# Patient Record
Sex: MALE | Race: WHITE | Employment: OTHER | URBAN - METROPOLITAN AREA
[De-identification: names, ages, dates, MRNs, and addresses within clinical notes are randomized per-mention and may not be internally consistent; named-entity substitution may affect disease eponyms.]

---

## 2020-07-16 ENCOUNTER — HOSPITAL ENCOUNTER (INPATIENT)
Age: 35
LOS: 5 days | Discharge: HOME | DRG: 753 | End: 2020-07-22
Attending: EMERGENCY MEDICINE | Admitting: PSYCHIATRY & NEUROLOGY
Payer: COMMERCIAL

## 2020-07-16 LAB
A/G RATIO: 2.5 (ref 1.1–2.2)
ACETAMINOPHEN LEVEL: <5 UG/ML (ref 10–30)
ALBUMIN SERPL-MCNC: 4.9 G/DL (ref 3.4–5)
ALP BLD-CCNC: 51 U/L (ref 40–129)
ALT SERPL-CCNC: 18 U/L (ref 10–40)
ANION GAP SERPL CALCULATED.3IONS-SCNC: 12 MMOL/L (ref 3–16)
AST SERPL-CCNC: 20 U/L (ref 15–37)
BASOPHILS ABSOLUTE: 0 K/UL (ref 0–0.2)
BASOPHILS RELATIVE PERCENT: 0.3 %
BILIRUB SERPL-MCNC: 1.3 MG/DL (ref 0–1)
BUN BLDV-MCNC: 11 MG/DL (ref 7–20)
CALCIUM SERPL-MCNC: 9.4 MG/DL (ref 8.3–10.6)
CHLORIDE BLD-SCNC: 101 MMOL/L (ref 99–110)
CO2: 26 MMOL/L (ref 21–32)
CREAT SERPL-MCNC: 1 MG/DL (ref 0.9–1.3)
EOSINOPHILS ABSOLUTE: 0 K/UL (ref 0–0.6)
EOSINOPHILS RELATIVE PERCENT: 0.5 %
ETHANOL: NORMAL MG/DL (ref 0–0.08)
GFR AFRICAN AMERICAN: >60
GFR NON-AFRICAN AMERICAN: >60
GLOBULIN: 2 G/DL
GLUCOSE BLD-MCNC: 111 MG/DL (ref 70–99)
HCT VFR BLD CALC: 46.8 % (ref 40.5–52.5)
HEMOGLOBIN: 16.2 G/DL (ref 13.5–17.5)
LYMPHOCYTES ABSOLUTE: 1.5 K/UL (ref 1–5.1)
LYMPHOCYTES RELATIVE PERCENT: 16.1 %
MCH RBC QN AUTO: 29.2 PG (ref 26–34)
MCHC RBC AUTO-ENTMCNC: 34.6 G/DL (ref 31–36)
MCV RBC AUTO: 84.2 FL (ref 80–100)
MONOCYTES ABSOLUTE: 0.7 K/UL (ref 0–1.3)
MONOCYTES RELATIVE PERCENT: 7.2 %
NEUTROPHILS ABSOLUTE: 6.9 K/UL (ref 1.7–7.7)
NEUTROPHILS RELATIVE PERCENT: 75.9 %
PDW BLD-RTO: 13.3 % (ref 12.4–15.4)
PLATELET # BLD: 229 K/UL (ref 135–450)
PMV BLD AUTO: 8.6 FL (ref 5–10.5)
POTASSIUM SERPL-SCNC: 3.9 MMOL/L (ref 3.5–5.1)
RBC # BLD: 5.56 M/UL (ref 4.2–5.9)
SALICYLATE, SERUM: <0.3 MG/DL (ref 15–30)
SODIUM BLD-SCNC: 139 MMOL/L (ref 136–145)
TOTAL PROTEIN: 6.9 G/DL (ref 6.4–8.2)
WBC # BLD: 9.1 K/UL (ref 4–11)

## 2020-07-16 PROCEDURE — 85025 COMPLETE CBC W/AUTO DIFF WBC: CPT

## 2020-07-16 PROCEDURE — 84443 ASSAY THYROID STIM HORMONE: CPT

## 2020-07-16 PROCEDURE — 99285 EMERGENCY DEPT VISIT HI MDM: CPT

## 2020-07-16 PROCEDURE — G0480 DRUG TEST DEF 1-7 CLASSES: HCPCS

## 2020-07-16 PROCEDURE — 36415 COLL VENOUS BLD VENIPUNCTURE: CPT

## 2020-07-16 PROCEDURE — 80053 COMPREHEN METABOLIC PANEL: CPT

## 2020-07-16 RX ORDER — CITALOPRAM 10 MG/1
10 TABLET ORAL DAILY
Status: ON HOLD | COMMUNITY
End: 2020-07-22 | Stop reason: HOSPADM

## 2020-07-16 SDOH — HEALTH STABILITY: MENTAL HEALTH: HOW OFTEN DO YOU HAVE A DRINK CONTAINING ALCOHOL?: NEVER

## 2020-07-17 PROBLEM — F31.2 BIPOLAR AFFECTIVE DISORDER, CURRENT EPISODE MANIC WITH PSYCHOTIC SYMPTOMS (HCC): Status: ACTIVE | Noted: 2020-07-17

## 2020-07-17 PROBLEM — F29 PSYCHOSIS (HCC): Status: ACTIVE | Noted: 2020-07-17

## 2020-07-17 LAB
AMPHETAMINE SCREEN, URINE: ABNORMAL
BARBITURATE SCREEN URINE: ABNORMAL
BENZODIAZEPINE SCREEN, URINE: ABNORMAL
CANNABINOID SCREEN URINE: POSITIVE
COCAINE METABOLITE SCREEN URINE: ABNORMAL
Lab: ABNORMAL
METHADONE SCREEN, URINE: ABNORMAL
OPIATE SCREEN URINE: ABNORMAL
OXYCODONE URINE: ABNORMAL
PH UA: 6
PHENCYCLIDINE SCREEN URINE: ABNORMAL
PROPOXYPHENE SCREEN: ABNORMAL
SARS-COV-2, NAAT: NOT DETECTED
TSH SERPL DL<=0.05 MIU/L-ACNC: 1.88 UIU/ML (ref 0.27–4.2)

## 2020-07-17 PROCEDURE — 99223 1ST HOSP IP/OBS HIGH 75: CPT | Performed by: PSYCHIATRY & NEUROLOGY

## 2020-07-17 PROCEDURE — 1240000000 HC EMOTIONAL WELLNESS R&B

## 2020-07-17 PROCEDURE — U0002 COVID-19 LAB TEST NON-CDC: HCPCS

## 2020-07-17 PROCEDURE — 80307 DRUG TEST PRSMV CHEM ANLYZR: CPT

## 2020-07-17 PROCEDURE — 99221 1ST HOSP IP/OBS SF/LOW 40: CPT | Performed by: PHYSICIAN ASSISTANT

## 2020-07-17 RX ORDER — HALOPERIDOL 10 MG/1
10 TABLET ORAL EVERY 6 HOURS PRN
Status: DISCONTINUED | OUTPATIENT
Start: 2020-07-17 | End: 2020-07-22 | Stop reason: HOSPADM

## 2020-07-17 RX ORDER — OLANZAPINE 10 MG/1
10 TABLET ORAL DAILY
Status: DISCONTINUED | OUTPATIENT
Start: 2020-07-17 | End: 2020-07-20

## 2020-07-17 RX ORDER — DIPHENHYDRAMINE HCL 25 MG
50 TABLET ORAL EVERY 6 HOURS PRN
Status: DISCONTINUED | OUTPATIENT
Start: 2020-07-17 | End: 2020-07-22 | Stop reason: HOSPADM

## 2020-07-17 RX ORDER — LORAZEPAM 2 MG/1
2 TABLET ORAL EVERY 6 HOURS PRN
Status: DISCONTINUED | OUTPATIENT
Start: 2020-07-17 | End: 2020-07-22 | Stop reason: HOSPADM

## 2020-07-17 RX ORDER — IBUPROFEN 400 MG/1
400 TABLET ORAL EVERY 6 HOURS PRN
Status: DISCONTINUED | OUTPATIENT
Start: 2020-07-17 | End: 2020-07-22 | Stop reason: HOSPADM

## 2020-07-17 RX ORDER — TRAZODONE HYDROCHLORIDE 100 MG/1
100 TABLET ORAL NIGHTLY
Status: DISCONTINUED | OUTPATIENT
Start: 2020-07-17 | End: 2020-07-22 | Stop reason: HOSPADM

## 2020-07-17 RX ORDER — TRAZODONE HYDROCHLORIDE 50 MG/1
50 TABLET ORAL NIGHTLY PRN
Status: DISCONTINUED | OUTPATIENT
Start: 2020-07-17 | End: 2020-07-17

## 2020-07-17 RX ORDER — LORAZEPAM 2 MG/1
2 TABLET ORAL EVERY 6 HOURS PRN
Status: DISCONTINUED | OUTPATIENT
Start: 2020-07-17 | End: 2020-07-17

## 2020-07-17 RX ORDER — HALOPERIDOL 5 MG/ML
10 INJECTION INTRAMUSCULAR EVERY 6 HOURS PRN
Status: DISCONTINUED | OUTPATIENT
Start: 2020-07-17 | End: 2020-07-22 | Stop reason: HOSPADM

## 2020-07-17 RX ORDER — DIPHENHYDRAMINE HYDROCHLORIDE 50 MG/ML
50 INJECTION INTRAMUSCULAR; INTRAVENOUS EVERY 6 HOURS PRN
Status: DISCONTINUED | OUTPATIENT
Start: 2020-07-17 | End: 2020-07-22 | Stop reason: HOSPADM

## 2020-07-17 RX ORDER — DIVALPROEX SODIUM 500 MG/1
500 TABLET, EXTENDED RELEASE ORAL DAILY
Status: DISCONTINUED | OUTPATIENT
Start: 2020-07-17 | End: 2020-07-20

## 2020-07-17 RX ORDER — LOSARTAN POTASSIUM 25 MG/1
50 TABLET ORAL DAILY
Status: DISCONTINUED | OUTPATIENT
Start: 2020-07-17 | End: 2020-07-22 | Stop reason: HOSPADM

## 2020-07-17 RX ORDER — ACETAMINOPHEN 325 MG/1
650 TABLET ORAL EVERY 4 HOURS PRN
Status: DISCONTINUED | OUTPATIENT
Start: 2020-07-17 | End: 2020-07-22 | Stop reason: HOSPADM

## 2020-07-17 RX ORDER — LORAZEPAM 2 MG/ML
2 INJECTION INTRAMUSCULAR EVERY 6 HOURS PRN
Status: DISCONTINUED | OUTPATIENT
Start: 2020-07-17 | End: 2020-07-17

## 2020-07-17 RX ORDER — LORAZEPAM 2 MG/ML
2 INJECTION INTRAMUSCULAR EVERY 6 HOURS PRN
Status: DISCONTINUED | OUTPATIENT
Start: 2020-07-17 | End: 2020-07-22 | Stop reason: HOSPADM

## 2020-07-17 ASSESSMENT — SLEEP AND FATIGUE QUESTIONNAIRES
DIFFICULTY STAYING ASLEEP: YES
DIFFICULTY ARISING: NO
DO YOU USE A SLEEP AID: NO
SLEEP PATTERN: UTA
DIFFICULTY FALLING ASLEEP: YES
DO YOU HAVE DIFFICULTY SLEEPING: YES
SLEEP PATTERN: DIFFICULTY FALLING ASLEEP;INSOMNIA
RESTFUL SLEEP: NO
AVERAGE NUMBER OF SLEEP HOURS: 6

## 2020-07-17 ASSESSMENT — PAIN SCALES - WONG BAKER
WONGBAKER_NUMERICALRESPONSE: 0

## 2020-07-17 ASSESSMENT — LIFESTYLE VARIABLES: HISTORY_ALCOHOL_USE: NO

## 2020-07-17 NOTE — ED NOTES
Pt in room talking with  - no signs or symptoms of distress noted - will continue to monitor      [facility] [place] [facility], RN  07/17/20 3329

## 2020-07-17 NOTE — H&P
Hospital Medicine History & Physical      PCP: No primary care provider on file. Date of Admission: 7/16/2020    Date of Service: Pt seen/examined on 7/17/2020    Chief Complaint:    Chief Complaint   Patient presents with    Psychiatric Evaluation     pt comes in with mobile crisis rep after being called by wife. Rep reports that pt has quit job, writing pages and pages of \"stuff\". Tonight wife found pt in closet with pistol thinking someone was trying to break in and steal his ideas. History Of Present Illness: The patient is a 30 y.o. male with a PMH of Anxietywho presented to [facility] for acute psychosis. Patient was seen and evaluated in the ED by the ED medical provider, patient was medically cleared for admission to [facility] at [facility]. This note serves as an admission medical H&P.   PCP: denies  Tobacco Use: denies  EtOH Use: denies  Illicit Drug Use: cannabis    Pt denies any medical concerns at this time. Past Medical History:        Diagnosis Date    Anxiety        Past Surgical History:        Procedure Laterality Date    VASECTOMY         Medications Prior to Admission:    Prior to Admission medications    Medication Sig Start Date End Date Taking? Authorizing Provider   citalopram (CELEXA) 10 MG tablet Take 10 mg by mouth daily    Historical Provider, MD       Allergies:  Pcn [penicillins]    Social History:  The patient currently lives at home with spouse. TOBACCO:   reports that he has never smoked. His smokeless tobacco use includes snuff. ETOH:   reports no history of alcohol use. Family History:   Positive as follows:    History reviewed. No pertinent family history.     REVIEW OF SYSTEMS:     Constitutional: Negative for fever   HENT: Negative for sore throat   Eyes: Negative for redness   Respiratory: Negative  for dyspnea, cough   Cardiovascular: Negative for chest pain   Gastrointestinal: Negative for vomiting, diarrhea   Genitourinary: Negative for hematuria Musculoskeletal: Negative for arthralgias   Skin: Negative for rash   Neurological: Negative for syncope   Hematological: Negative for adenopathy   Psychiatric/Behavorial: Negative for anxiety    PHYSICAL EXAM:    /84   Pulse 72   Temp 97.3 °F (36.3 °C) (Temporal)   Resp 16   Ht 6' (1.829 m)   Wt 220 lb (99.8 kg)   SpO2 98%   BMI 29.84 kg/m²   Gen: No distress. Alert. Young  male, iritable  Eyes: PERRL. No sclera icterus. No conjunctival injection. ENT: No discharge. Pharynx clear. Neck:  Trachea midline. Resp: No accessory muscle use. No crackles. No wheezes. No rhonchi. CV: Regular rate. Regular rhythm. No murmur. No rub. No edema. GI: Soft, Non-tender. Non-distended. Normal bowel sounds. Skin: Warm and dry. No rash on exposed extremities. M/S: No cyanosis. No clubbing. Neuro: Awake. Grossly nonfocal, CN II-XII intact    Psych: Defer to psychiatry. CBC:   Recent Labs     07/16/20  2305   WBC 9.1   HGB 16.2   HCT 46.8   MCV 84.2        BMP:   Recent Labs     07/16/20  2305      K 3.9      CO2 26   BUN 11   CREATININE 1.0     LIVER PROFILE:   Recent Labs     07/16/20  2305   AST 20   ALT 18   BILITOT 1.3*   ALKPHOS 51     CULTURES    SARS-CoV-2: negative    ASSESSMENT/PLAN:    Acute Psychosis  - cont mgmt per [facility]    Cannabis Abuse  - UDS +  - counseled cessation     Pt has no medical complaints at this time. Pt was informed that they may have [facility] contact us should any medical concerns arise during this admission.     Jorge A Padilla PA-C 1:35 PM 7/17/2020

## 2020-07-17 NOTE — ED NOTES
Collateral Contact:  Name: Eamon Mcdonald   Relation to Patient: Wife   Last Contact with Patient: yesterday   Concerns: Eamon reports this has been building up for about six months now and has gotten worse in the last eight days. Eamon states pt woke up one morning and felt like he was the chosen and the ideas he had were going to save the world. She reports pt states how he needed to get to [facility] to explain his ideas to him and change the world. Pt was going to jump on helicopter with a friend and go to [facility]. She states pt has been seeing things such as eyes in a light bulb in the basement. She states people have been talking to pt. She states pt has a voice telling him things. She reports pt has been writing in notebooks and writing every thought down. She reports pt is not sleeping and that he will sit in the closet and write in his notebook. She reports he has stopped taking all medications and feels he does not need medications. She states she and pt's best friends had a meeting today to discuss their worries about pt. She reports pt does not know they met up. She reports pt called them while they were there and said he could not keep living/doing this much longer, I'm on the steps heading up to get my pistol. She reports they went home as fast they could. She reports when they got their they were able to get the gun away from pt. She reports the gun was never loaded but she hid it from him and her father in law now has the gun. She reports pt stated he never intended to hurt himself but felt like someone was after him and going to take his ideas and kill him. She reports pt did not have that thought until he went out in public. She reports pt has not gone to work as well. She reports pt is quick to anger if you do not agree with him. She reports he is very on edge. She reports pt was not like that before. She reports pt has been having revelations. She reports she wants pt safe.  She reports she wants pt to get help and wants the best for pt.      Patrick Howell MSW,LSW

## 2020-07-17 NOTE — FLOWSHEET NOTE
07/17/20 1328   Psychiatric History   Psychiatric history treatment Current treatment   Are there any medication issues? No   Support System   Support system Adequate   Types of Support System Spouse   Problems in support system None   Current Living Situation   Home Living Adequate   Living information Lives with others   Problems with living situation  No   Lack of basic needs No   SSDI/SSI SEFERINO. pt is psychotic and is currently sleeping   Other government assistance SEFERINO   Problems with environment SEFERINO   Current abuse issues SEFERINO   Supervised setting None   Relationship problems   (SEFERINO)   Medical and Self-Care Issues   Relevant medical problems SEFERINO   Relevant self-care issues SEFERINO. per chart review pt has not been eating or sleeping   Barriers to treatment   (SEFERINO)   Family Constellation   Spouse/partner-name/age Shannan   Children-names/ages pt has children   Parents SEFERINO   Siblings SEFERINO   Support services   (SEFERINO)   Childhood   Raised by   (SEFERINO)   Relevant family history SEFERINO   History of abuse   (SEFERINO)    Abuse Assessment   Physical Abuse Unable to assess   Verbal Abuse Unable to assess   Emotional abuse Unable to assess    Financial Abuse Unable to assess    Sexual abuse Unable to assess    Elder abuse No   Substance Use   Use of substances    (SEFERINO)   Education   Education   (SEFERINO)   Work History   Currently employed No  (per chart review pt quit his job)   Recent job loss or change Quit    service   ([address])   Leisure/Activity   Past interests peer chart review video games, golf, sports   Present interests same   Current daily activity same   Social with friends/family   (SEFERINO)   Cultural and Spiritual   Spiritual concerns   (SEFERINO)   Cultural concerns   (SEFERINO)     Pt's assessments completed by chart review. Pt is psychotic and is currently sleeping. Per nursing  was asked not to wake pt due to his psychosis and pt needing to sleep.
Identified Strengths  \"I'm in the most clear spot of my life. I have visions and ideas on how to save the world. \" and \"It's me letting go of the judgements of others that are holding me back. \"   Patient Identified Limitations  \"The fabrication of me holding the pistol. I'm going to david their a*ses off. They can do a test and see there is no residue on my hand. \" and \"I went for the pistol because I was feeling alone on this earth, which is the worst feeling, and I was waiting for my friends to come so I wouldn't be alone. \"   Perception of most stressful event prior to hospitalization \"I want the people to be punished and sat down and talked to about why I was put here. This is not the way people should be treated. The police that came and got me and were in the house that I built were bullies. \"   Perception of changes needed \"I want to be in my house so I can do my writings. This is not safe here. I've always hated hospitals. As much as you think you're helping me, you're destroying me. \"   Strengths and Limitations   Strengths Independent in basic self-care activities; Positive leisure interests; Positive support network   Limitations Difficult relationships / poor social skills;Unrealistic self-view;Demonstrates discomfort with /lack of social skills; Difficulty problem solving/relies on others to help solve problems     Therapist met with Roxana Howard and completed Leisure Assessment.

## 2020-07-17 NOTE — BH NOTE
..   `[facility] [place]  Admission Note     Admission Type:   Admission Type: Involuntary    Reason for admission:  Reason for Admission: Patient psychotic, and experiencing grandiose delusions. Patient believes he has ideas that will change the world. Patient wants to speak tp Marcia about his ideas.     PATIENT STRENGTHS:  Strengths: Employment, Motivated, No significant Physical Illness, Positive Support    Patient Strengths and Limitations:  Limitations: Unrealistic self-view    Addictive Behavior:   Addictive Behavior  In the past 3 months, have you felt or has someone told you that you have a problem with:  : None  Do you have a history of Chemical Use?: Yes  Do you have a history of Alcohol Use?: No  Do you have a history of Street Drug Abuse?: Yes  Histroy of Prescripton Drug Abuse?: No    Medical Problems:   Past Medical History:   Diagnosis Date    Anxiety        Status EXAM:  Status and Exam  Normal: No  Facial Expression: Elevated, Exaggerated, Worried  Affect: Incongruent  Level of Consciousness: Alert  Mood:Normal: No  Mood: Anxious, Depressed, Suspicious, Irritable  Motor Activity:Normal: No  Motor Activity: Increased  Interview Behavior: Irritable  Preception: [place] to Person, [place] to Time, [place] to Place  Attention:Normal: No  Attention: Distractible, Hyperalert  Thought Processes: Flt.of Ideas, Loose Assoc., Tangential  Thought Content:Normal: No  Thought Content: Delusions, Ideas of Influence, Paranoia  Hallucinations: None  Delusions: Yes  Delusions: Grandeur, Influence  Memory:Normal: No  Memory: Confabulation, Poor Remote  Insight and Judgment: No  Insight and Judgment: Poor Judgment, Poor Insight, Unrealistic  Present Suicidal Ideation: No  Present Homicidal Ideation: No    Tobacco Screening:  Practical Counseling, on admission, merced X, if applicable and completed (first 3 are required if patient doesn't refuse):            ( )  Recognizing danger situations (included triggers and roadblocks)                    ( )  Coping skills (new ways to manage stress, exercise, relaxation techniques, changing routine, distraction)                                                           ( )  Basic information about quitting (benefits of quitting, techniques in how to quit, available resources  ( ) Referral for counseling faxed to [facility]     (X) Patient has not smoked in the last 30 days    Metabolic Screening:    No results found for: LABA1C    No results found for: CHOL  No results found for: TRIG  No results found for: HDL  No components found for: LDLCAL  No results found for: LABVLDL      Body mass index is 29.84 kg/m². BP Readings from Last 2 Encounters:   07/17/20 124/84           Pt admitted with followings belongings:  Dentures: None  Vision - Corrective Lenses: None  Hearing Aid: None  Jewelry: None  Body Piercings Removed: No  Clothing: Footwear, Shirt, Socks, Pants, Undergarments (Comment)  Were All Patient Medications Collected?: Not Applicable  Other Valuables: None     Valuables in patient's locker Valuables placed in safe in security envelope, number: N/A. Patient's home medications were N/A. Patient oriented to surroundings and program expectations and copy of patient rights given Yes. Received admission packet: Yes. Consents reviewed and signed patient refused to sign Medication treatment plan. Patient verbalize understanding: Yes. Patient education on precautions: Yes.                    Author Erwin RN

## 2020-07-17 NOTE — H&P
INITIAL PSYCHIATRIC HISTORY AND PHYSICAL      Patient name: Tianna Medina  Admit date: 7/16/2020  Today's date: 7/17/2020           CC:  Psychosis     HPI:      Psychiatric Evaluation       pt comes in with mobile crisis rep after being called by wife. Rep reports that pt has quit job, writing pages and pages of \"stuff\". Tonight wife found pt in closet with pistol thinking someone was trying to break in and steal his ideas. Patient seen in room on Adult Behavioral Unit. Patient is a 30 y.o. male with a PMH of anxiety and depression who presents to [facility] for acute psychosis. Spoke to the wife and she said he has been acting \"different\" for about 6 months, but it has dramatically worsened over the past week. She states he has been having ideas of how he can change the world and he has been researching [employer] and has to meet him to start making the world a better place because [employer] will understand him. He has a notebook that he has been writing his ideas in so he does not forget them. She states he's not been sleeping, staying up all night watching documentaries and researching [employer]. 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 His wife and other members of his family were having a meeting that he was unaware of to discuss his change in behavior and figure out what they should do when he called her sobbing and told her he \"didn't know how much longer he could continue to do this\" and that he was paranoid that \"some people\" were going to break in and try and get his ideas, so he decided to hide in the closet. He had a pistol in the closet and reportedly asked if he should load it just in case someone did come to harm him. The family called the police and returned home to find him unarmed, but feel unsafe around him.       According to the patient he was/is not suicidal and had no intention to harm himself or anyone else, he just felt that he might need to load it in case someone did come after his ideas. He reports taunting the police once they were in his house but does not understand why he was handcuffed and brought to [facility]. States he was not a danger to himself or anyone else. Patient explains about 6 months ago he had a revelation and now feels like a completely different person. He apparently is one of four \"chosen ones\" to change the world; Khan [place], and himself. He says he's been doing a lot of research and he's now able to understand very difficult concepts, believes we need to talk to the aliens to work with them to better the human race etc. He called his wife on the phone in front of us and became very explosive during the conversation, raising his voice, pacing, and finally setting the phone down and walking away. He is wanting to go home and does not wish to take any medications. PAST PSYCHIATRIC HISTORY:  Depression    FAMILY PSYCHIATRIC HISTORY:   History reviewed. No pertinent family history. ALLERGIES:    Allergies   Allergen Reactions    Pcn [Penicillins] Other (See Comments)     unknown       CURRENT MEDICATIONS:     traZODone  100 mg Oral Nightly    OLANZapine  10 mg Oral Daily    divalproex  500 mg Oral Daily    losartan  50 mg Oral Daily       PAST MEDICAL HISTORY:    Past Medical History:   Diagnosis Date    Anxiety         PAST SURGICAL HISTORY:    Past Surgical History:   Procedure Laterality Date    VASECTOMY         PROBLEM LIST:  Principal Problem:    Bipolar affective disorder, current episode manic with psychotic symptoms ([facility] [address]  Active Problems:    Cannabis abuse  Resolved Problems:    * No resolved hospital problems.  *       SOCIAL HISTORY:  Social History     Socioeconomic History    Marital status:      Spouse name: Not on file    Number of children: 3    Years of education: 15    Highest education level: Not on file   Occupational History    Not on file   Social Needs    Financial resource strain: Not on file    Food insecurity     Worry: Not on file     Inability: Not on file    Transportation needs     Medical: Not on file     Non-medical: Not on file   Tobacco Use    Smoking status: Never Smoker    Smokeless tobacco: Current User     Types: Snuff   Substance and Sexual Activity    Alcohol use: Never     Frequency: Never    Drug use: Yes     Types: Marijuana    Sexual activity: Yes     Partners: Female   Lifestyle    Physical activity     Days per week: Not on file     Minutes per session: Not on file    Stress: Not on file   Relationships    Social connections     Talks on phone: Not on file     Gets together: Not on file     Attends Latter-day service: Not on file     Active member of club or organization: Not on file     Attends meetings of clubs or organizations: Not on file     Relationship status: Not on file    Intimate partner violence     Fear of current or ex partner: Not on file     Emotionally abused: Not on file     Physically abused: Not on file     Forced sexual activity: Not on file   Other Topics Concern    Not on file   Social History Narrative    Not on file       OBJECTIVE:   Vitals:    07/17/20 1245   BP: 118/82   Pulse: 80   Resp:    Temp:    SpO2:        REVIEW OF SYSTEMS:   Reports no current cardiovascular, respiratory, gastrointestinal, genitourinary,   integumentary, neurological, musculoskeletal, or immunological symptoms today. PSYCHIATRIC:  See HPI above     PSYCHIATRIC EXAMINATION / MENTAL STATUS EXAM:     CONSTITUTIONAL:    Vitals:    Blood pressure 118/82, pulse 80, temperature 97.3 °F (36.3 °C), temperature source Temporal, resp. rate 20, height 6' (1.829 m), weight 220 lb (99.8 kg), SpO2 95 %.   General appearance:  [x]  appears age, []  appears older than stated age,     [x]  adequately dressed and groomed, [] disheveled,                []  in no acute distress, [x] appears mildly distressed,      []  Other:      MUSCULOSKELETAL:   Gait:   [x] normal, [] antalgic, [] unsteady, [] in bed, gait not evaluated   Station:  [x] erect, [] sitting, [] recumbent, [] other        Strength/tone:  [x] muscle strength and tone appear consistent with age and condition     [] atrophy      [] abnormal movements  PSYCHIATRIC:    Relatedness:  [x] cooperative, [x] guarded, [] indifferent, [] hostile,      [] sedated  Speech:  [] normal prosody, [x] pressured, [] decreased volume,    [] slurred, [] halting, [] slowed, [] other     [] echolalia, [] incoherent, [] stuttering   Eye contact:  [x] direct, [] avoidant, [x] intense  Kinetics:  [] normal, [x] increased, [] decreased  Mood:   [] stable, [] depressed, [] anxious, [] irritable,     [x] labile  Affect:   [] normal range, [] constricted, [] depressed, [x] anxious,     [] angry, [] blunted  Hallucinations  [x] denies, [] auditory,  [] visual,  [] olfactory, [] tactile  Delusions  [] none, [x] grandiose,  [] jealous,  [] persecutory,  [] somatic,     [] bizarre  Preoccupations  [] none, [] violence, [x] obsessions, [] other     Suicidal ideation  [x] denies, [] endorses  Homicidal ideation [x] denies, [] endorses  Thought process: [] logical, [] circumstantial, [] tangential, [] GHISLAINE,     [] simplistic, [x] disorganized  Associations:  [] logical and coherent, [] loosening, [x] disorganized  Insight:   [] good, [] fair, [x] poor  Judgment:  [] good, [] fair, [x] poor  Attention and concentration:     [] intact, [x] limited, [] able to focus on interview,     [] grossly impaired  Orientation:  [x] person, place, time, situation     [] disoriented to:     Memory:  Remote memory [x] intact, [] impaired     Recent memory  [x] intact, [] impaired          IMPRESSION    Principal Problem:    Bipolar affective disorder, current episode manic with psychotic symptoms ([address]  Active Problems:    Cannabis abuse  Resolved Problems:    * No resolved hospital problems. *       ______  Dx:   Axis I: Bipolar affective disorder, current episode manic with psychotic symptoms ([facility] [address]   Axis 2: deferred   [place] 3: See Medical History  Patient Active Problem List    Diagnosis Date Noted    Psychosis ([facility] [address] 07/17/2020    Bipolar affective disorder, current episode manic with psychotic symptoms ([facility] [address] 07/17/2020    Cannabis abuse     And Present on Admission:   Bipolar affective disorder, current episode manic with psychotic symptoms ([facility] [address]   Cannabis abuse    Axis 4: Occupational problems    Axis 5: 21-30 behavior considerably influenced by delusions or hallucinations OR serious impairment in judgment, communication OR inability to function in almost all areas   All conditions on Axis 1 and Axis 2 and active Axis 3 problems are being treated while patient is hospitalized. Active Hospital Problems    Diagnosis Date Noted    [facility], [facility] [F31.2] 07/17/2020    Cannabis abuse [F12.10]      Tx plan:    prevent self injury, stabilize affect, restore sleep, treat depression, treat anxiety, establish/maintain aftercare, increase coping mechanisms, improve medication compliance. All conditions present on admission are being treated while pt is hospitalized. Discussed PHP after discharge as part of transition back to the community.      Medications  Current Facility-Administered Medications   Medication Dose Route Frequency Provider Last Rate Last Dose    acetaminophen (TYLENOL) tablet 650 mg  650 mg Oral Q4H PRN Colonel Temo MD        ibuprofen (ADVIL;MOTRIN) tablet 400 mg  400 mg Oral Q6H PRN Colonel Temo MD        magnesium hydroxide (MILK OF MAGNESIA) 400 MG/5ML suspension 30 mL  30 mL Oral Daily PRN Colonel Temo MD        haloperidol (HALDOL) tablet 10 mg  10 mg Oral Q6H PRN Hiren Hebert MD        Or    haloperidol lactate (HALDOL) injection

## 2020-07-17 NOTE — ED PROVIDER NOTES
Triage Chief Complaint:   Psychiatric Evaluation (pt comes in with mobile crisis rep after being called by wife. Rep reports that pt has quit job, writing pages and pages of \"stuff\". Tonight wife found pt in closet with pistol thinking someone was trying to break in and steal his ideas.)      [place]:  Tianna Medina is a 30 y.o. male that presents to the emergency department for psychiatric evaluation. He was brought in by Mobile Crisis after being called by his wife. They state that he recently has quit his job and is writing pages and pages of things. He tells me that he is really connecting the people \"on a spiritual level. \"  He is trying to understand everything in the universe and \"wanting to help any way I can. \"  Apparently the wife found him tonight in a closet with a gun because he thought someone might try to break it and steal his notebook full of ideas. .    Past Medical History:   Diagnosis Date    Anxiety      Past Surgical History:   Procedure Laterality Date    VASECTOMY       History reviewed. No pertinent family history.   Social History     Socioeconomic History    Marital status:      Spouse name: Not on file    Number of children: Not on file    Years of education: Not on file    Highest education level: Not on file   Occupational History    Not on file   Social Needs    Financial resource strain: Not on file    Food insecurity     Worry: Not on file     Inability: Not on file    Transportation needs     Medical: Not on file     Non-medical: Not on file   Tobacco Use    Smoking status: Never Smoker    Smokeless tobacco: Current User     Types: Snuff   Substance and Sexual Activity    Alcohol use: Never     Frequency: Never    Drug use: Yes     Types: Marijuana    Sexual activity: Not on file   Lifestyle    Physical activity     Days per week: Not on file     Minutes per session: Not on file    Stress: Not on file   Relationships    Social connections     Talks on phone: Not on file     Gets together: Not on file     Attends Sabianist service: Not on file     Active member of club or organization: Not on file     Attends meetings of clubs or organizations: Not on file     Relationship status: Not on file    Intimate partner violence     Fear of current or ex partner: Not on file     Emotionally abused: Not on file     Physically abused: Not on file     Forced sexual activity: Not on file   Other Topics Concern    Not on file   Social History Narrative    Not on file     No current facility-administered medications for this encounter. Current Outpatient Medications   Medication Sig Dispense Refill    citalopram (CELEXA) 10 MG tablet Take 10 mg by mouth daily       Allergies   Allergen Reactions    Pcn [Penicillins] Other (See Comments)     unknown     Nursing Notes Reviewed    ROS:  At least 10 systems reviewed and otherwise negative except as in the [address] Physical Exam:  ED Triage Vitals [07/16/20 2243]   Enc Vitals Group      BP (!) 147/90      Pulse 84      Resp 14      Temp 99.6 °F (37.6 °C)      Temp Source Oral      SpO2 97 %      Weight 220 lb (99.8 kg)      Height 6' (1.829 m)      Head Circumference       Peak Flow       Pain Score       Pain Loc       Pain Edu? Excl. in [address]? My pulse oximetry interpretation is which is within the normal range    GENERAL APPEARANCE: Awake and alert. Cooperative. No acute distress. HEAD:  Atraumatic. EYES: EOM's grossly intact. ENT: Mucous membranes are moist.  No trismus. NECK:  Trachea midline. EXTREMITIES: No acute deformities. SKIN: Warm and dry. NEUROLOGICAL: No gross facial drooping. Moves all 4 extremities spontaneously.   PSYCHIATRIC: Calm, flight of ideas    I have reviewed and interpreted all of the currently available lab results from this visit (if applicable):  Results for orders placed or performed during the hospital encounter of 07/16/20   CBC Auto Differential   Result Value Ref Range    WBC 9.1

## 2020-07-17 NOTE — ED NOTES
Collateral Contact:  Name: Noel Elizabeth  Relation to Patient: Crisis Mobile  Last Contact with Patient: 7/16/2020  Concerns:     First psychotic break - 8 days - fully functioning - grandiose - plans - Vandana Miller going to change the world - writes all day and all night - not working, sleeping or eating - wife found him in closet with his gun because epople were going to break in and steal his ideas - kids in the house and wife is worried - patient is quick to anger when you question him or talks about something that is not what he wants to talk about he becomes verbally aggressive - this is not normal per wife - took multiple officers to bring him to the hospital - has been driving around following signs to [facility]  07/16/20 4824

## 2020-07-17 NOTE — ED NOTES
Vitals obtained, no signs of distress noted. Pt resting in room. Will continue to monitor for safety.      Elizabeth Blandon MSW,LSW

## 2020-07-17 NOTE — BH NOTE
Rachel Thomas spent most of the afternoon sleeping in his room. When awake writer approached patient regarding medications ordered for him. Blood pressure medication discussed first, and he declined. He states he would \"rather try to care of it naturally and live the right kind of life, free of stress and misaligned ideas\". Educated patient regarding the goal of medication and time needed to be therapeutic, and what his blood pressure readings were today. He remains opposed to all medications. Writer did not address psych meds to maintain established relationship of trust and willingness to accept other aspects of care. Patient states, \"I feel like I was on the break of mental breakdown because I'm tired of the high stress life, and wanting more and more money. And instead of breakdown I found a moment of enlightenment, that \"DR\" said is psychotic. \" \" I don't appreciate him meeting me for 12 seconds and deciding I'm psychotic, like I'm crazy. That's the stuff you see on TV. \"  Pt trusting writer enough to eat and drink, and comply with other aspects of care. He is steadfast in his belief that Marcia is the only person who truly cares and is doing something for humanity, and he [place]) has been chosen to be enlightened and others, including staff, can't truly understand him. He directly asked why the MD labeled him psychotic, so writer pointed out specific behaviors that are and are not \"abnormal\", he was attentive and verbalized understanding, but lacked ability to convey understanding. Pt has been able to redirect and calm himself when agitated, has not need PRN medications.

## 2020-07-17 NOTE — BH NOTE
Patients wife called multiple times to check on  and provide information. She shared patient went golfing on the 10th, and came home profusely sweating, vommiting and began sleeping for extended periods of time. Since that date he has been complaining of extremely hot or cold.

## 2020-07-17 NOTE — BH NOTE
Pt pleasant with writer when introduced and verbalized only need as \"sustinance\" and not be lied to anticipating that writer would not let him down. He agreed to VS assessment and verbalized understanding and willingness to provide urine sample when he could. Pt was talking with another staff member during assessment and was very animated, had difficulty keeping arm still  and ramped up, /107 HR 80. Will re-attempt when he is calmer and alone, no signs of acute distress. His speech is pressured, disorganized, with flight of ideas, and grandeur. He is paranoid, has persecutory thoughts and preoccupations, compulsively writing in journal.  He was agitated after meeting with MD and talking with his wife on the phone. Will continue to monitor for best opportunity for assessment. He remains cooperative with writer, and requests a  who \"gets paid enough to be on my side. \"

## 2020-07-17 NOTE — ED NOTES
Writer went into assess pt. Pt calm cooperative and respectful. Pt denies suicidal ideations and homicidal ideations. Pt denies hx of suicide attempts. Pt states he has had revelation after revelation. Pt states he can see the past and future. Pt states he hears a voice that is telling him to keep doing what he is doing. Pt states he has the solution to help the planet and man kind. Pt states only [facility]  is intelligent enough to tell him if he is crazy or not, and understand his ideas. Pt states he wants to get his ideas to [place]  to help make them into reality, to save the world. Pt states aliens are real and that the world we live in is not real. Pt states we are in a simulation and the person who we think is GOD is not the GOD we think he is. Pt states he was looking for answers and the universe provided it with videos for him to watch to understand the deeper meaning. Pt states the next video he was suppose to watch would just come up when he needed to watch it. Pt states he saw a sign in the mirror that gave him the belief he was doing the right thing. Pt states it was a surreal moment when that happened. Pt states he just wants to help save the world and man kind by the answers and ideas he has. Pt states he has the solution. Pt does not understand why this is happening to him and states he should be being treated this way. Pt states he is being treated unfairly in this process. Pt presents with poor judgement and insight. Pt states he was demanding the law as to why he has to stay. Pt states he has a business plan and we are holding him back from it. Pt states \"just because I scared my wife a little means I'm crazy\". Pt states he does not want to take any medications to be free of all chemicals. Pt states he wants to be on a all natural diet. Pt was brought in by police on a hold. Pt states he is on a different plane than everyone else.  Pt states he is part of the chosen four and he is the end part of it. Pt states he needs to get his message out. Pt states he has gotten his ideas but because he was on the end of a mental break down but actually it is break through. Pt states he was on the verge of a mental break down and had a mental breakthrough instead.           Clifton Benedict MSW,LSW

## 2020-07-17 NOTE — ED NOTES
Level of Care Disposition: Admit  Patient was seen by ED provider and [facility] AN AFFILIATE OF [facility] staff. The case presented to psychiatric provider on-call Dr. Karthikeyan Lancaster. Based on the ED evaluation and information presented to the provider by Kayden Melendrez RN it was determined that inpatient hospitalization is the least restrictive environment for the patient at this time. The patient will be admitted to the inpatient unit. Admitting provider did not order suicide precautions based on patient currently bj for safety.       RATIONALE FOR ADMISSION:   Patient is at imminent risk of violating their own rights or the rights of others demonstrated by patient having psychotic and irrational thoughts AND they could benefit from psychiatric treatment               Lillian Millan RN  07/17/20 5305

## 2020-07-17 NOTE — ED NOTES
Pt irate and upset at being admitted - patient demanding that he be told who the \"person\" is that has the authority to place him here - explained to patient reason for admission - patient states that he is a on different plane than any of us and that doesn't make it right or make him crazy - staff explained to patient that no believes he is crazy but that there are safety concerns which need to be addressed - patient calmed and willing allowed RN to sukh Obrien RN  07/17/20 5057

## 2020-07-18 LAB
CHOLESTEROL, TOTAL: 146 MG/DL (ref 0–199)
HDLC SERPL-MCNC: 39 MG/DL (ref 40–60)
LDL CHOLESTEROL CALCULATED: 85 MG/DL
TRIGL SERPL-MCNC: 111 MG/DL (ref 0–150)
VLDLC SERPL CALC-MCNC: 22 MG/DL

## 2020-07-18 PROCEDURE — 6370000000 HC RX 637 (ALT 250 FOR IP): Performed by: PSYCHIATRY & NEUROLOGY

## 2020-07-18 PROCEDURE — 83036 HEMOGLOBIN GLYCOSYLATED A1C: CPT

## 2020-07-18 PROCEDURE — 1240000000 HC EMOTIONAL WELLNESS R&B

## 2020-07-18 PROCEDURE — 36415 COLL VENOUS BLD VENIPUNCTURE: CPT

## 2020-07-18 PROCEDURE — 99233 SBSQ HOSP IP/OBS HIGH 50: CPT | Performed by: PSYCHIATRY & NEUROLOGY

## 2020-07-18 PROCEDURE — 80061 LIPID PANEL: CPT

## 2020-07-18 RX ADMIN — LOSARTAN POTASSIUM 50 MG: 25 TABLET, FILM COATED ORAL at 09:06

## 2020-07-18 RX ADMIN — OLANZAPINE 10 MG: 10 TABLET, FILM COATED ORAL at 09:07

## 2020-07-18 ASSESSMENT — PAIN SCALES - WONG BAKER
WONGBAKER_NUMERICALRESPONSE: 0

## 2020-07-18 NOTE — BH NOTE
Encouraged client to take cozar this AM for increased blood pressure. 176/104. Client denies any symptoms. Client reports, \" I really only want to take medications that are all natural and not made in a lab. Client was agreeable to take some of the medications, refused the depakote. Will continue to monitor.

## 2020-07-18 NOTE — PROGRESS NOTES
Pt wife called at this time and was updated. Pt attending group at this time. Pt belongings from wife looked through this time. Two shirts, 2 pair of socks, sweat pants, shorts one pair of sandals and a letter with three pictures  All given to patient along with shower and oral supplies. Pt becomes tearful but very grateful.

## 2020-07-18 NOTE — BH NOTE
Called and spoke with wife, reports using THC daily, bought off the street. Wife reports he was on lexapro, \" he decided to stop taking it, it really helped his mind to stop racing. \" reports he has increase in stress with job. 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 \" its very stressful     Wife also reports they have been together since highschool and has never had any mental parth issues.

## 2020-07-18 NOTE — GROUP NOTE
Group Therapy Note    Date: 7/18/2020    Group Start Time: 10:00 am  Group End Time: 11:00 am  Group Topic: Cognitive Skills    [address] [facility], [facility]       Patient's Goal:  Pt will take The Five Love Language Quiz and participate in discussion over their results. Notes: Pt would often minimize why he's here and kept stating how he wants to help everyone else but made no effort to self reflect. Pt was appropriate and participated in group. Status After Intervention:  Improved    Participation Level:  Active Listener and Interactive    Participation Quality: Appropriate, Attentive, Sharing and Supportive      Speech:  normal      Thought Process/Content: Logical      Affective Functioning: Congruent      Mood: depressed      Level of consciousness:  Alert and Oriented x4      Response to Learning: Able to verbalize current knowledge/experience, Able to verbalize/acknowledge new learning and Progressing to goal      Endings: None Reported    Modes of Intervention: Education, Socialization and Activity      Discipline Responsible: /Counselor      Signature:  MICHELLE Aleman

## 2020-07-18 NOTE — PROGRESS NOTES
34.0 pg Final    MCHC 07/16/2020 34.6  31.0 - 36.0 g/dL Final    RDW 07/16/2020 13.3  12.4 - 15.4 % Final    Platelets 32/51/4050 229  135 - 450 K/uL Final    MPV 07/16/2020 8.6  5.0 - 10.5 fL Final    Neutrophils % 07/16/2020 75.9  % Final    Lymphocytes % 07/16/2020 16.1  % Final    Monocytes % 07/16/2020 7.2  % Final    Eosinophils % 07/16/2020 0.5  % Final    Basophils % 07/16/2020 0.3  % Final    Neutrophils Absolute 07/16/2020 6.9  1.7 - 7.7 K/uL Final    Lymphocytes Absolute 07/16/2020 1.5  1.0 - 5.1 K/uL Final    Monocytes Absolute 07/16/2020 0.7  0.0 - 1.3 K/uL Final    Eosinophils Absolute 07/16/2020 0.0  0.0 - 0.6 K/uL Final    Basophils Absolute 07/16/2020 0.0  0.0 - 0.2 K/uL Final    Sodium 07/16/2020 139  136 - 145 mmol/L Final    Potassium 07/16/2020 3.9  3.5 - 5.1 mmol/L Final    Chloride 07/16/2020 101  99 - 110 mmol/L Final    CO2 07/16/2020 26  21 - 32 mmol/L Final    Anion Gap 07/16/2020 12  3 - 16 Final    Glucose 07/16/2020 111* 70 - 99 mg/dL Final    BUN 07/16/2020 11  7 - 20 mg/dL Final    CREATININE 07/16/2020 1.0  0.9 - 1.3 mg/dL Final    GFR Non- 07/16/2020 >60  >60 Final    Comment: >60 mL/min/1.73m2 EGFR, calc. for ages 25 and older using the  MDRD formula (not corrected for weight), is valid for stable  renal function.  GFR  07/16/2020 >60  >60 Final    Comment: Chronic Kidney Disease: less than 60 ml/min/1.73 sq.m. Kidney Failure: less than 15 ml/min/1.73 sq.m. Results valid for patients 18 years and older.       Calcium 07/16/2020 9.4  8.3 - 10.6 mg/dL Final    Total Protein 07/16/2020 6.9  6.4 - 8.2 g/dL Final    Alb 07/16/2020 4.9  3.4 - 5.0 g/dL Final    Albumin/Globulin Ratio 07/16/2020 2.5* 1.1 - 2.2 Final    Total Bilirubin 07/16/2020 1.3* 0.0 - 1.0 mg/dL Final    Alkaline Phosphatase 07/16/2020 51  40 - 129 U/L Final    ALT 07/16/2020 18  10 - 40 U/L Final    AST 07/16/2020 20  15 - 37 U/L Final    Globulin 07/16/2020 2.0  g/dL Final    Acetaminophen Level 07/16/2020 <5* 10 - 30 ug/mL Final    Comment: Therapeutic Range: 10.0-30.0 ug/mL  Toxic: >=407 ug/mL      Salicylate, Serum 47/67/8403 <0.3* 15.0 - 30.0 mg/dL Final    Comment: Therapeutic Range: 15.0-30.0 mg/dL  Toxic: >30.0 mg/dL      Ethanol Lvl 07/16/2020 None Detected  mg/dL Final    Comment:    None Detected  Conversion factor:  100 mg/dl = .100 g/dl  For Medical Purposes Only      Amphetamine Screen, Urine 07/17/2020 Neg  Negative <1000ng/mL Final    Barbiturate Screen, Ur 07/17/2020 Neg  Negative <200 ng/mL Final    Benzodiazepine Screen, Urine 07/17/2020 Neg  Negative <200 ng/mL Final    Cannabinoid Scrn, Ur 07/17/2020 POSITIVE* Negative <50 ng/mL Final    Cocaine Metabolite Screen, Urine 07/17/2020 Neg  Negative <300 ng/mL Final    Opiate Scrn, Ur 07/17/2020 Neg  Negative <300 ng/mL Final    Comment: \"Therapeutic levels of pain medication, especially oxycontin and synthetic  opioids, may not be detected by this Methodology. Pain management screen  panel  Drug panel-PM-Hi Res Ur, Interp (PAIN) should be considered for drug  monitoring \".  PCP Screen, Urine 07/17/2020 Neg  Negative <25 ng/mL Final    Methadone Screen, Urine 07/17/2020 Neg  Negative <300 ng/mL Final    Propoxyphene Scrn, Ur 07/17/2020 Neg  Negative <300 ng/mL Final    Oxycodone Urine 07/17/2020 Neg  Negative <100 ng/ml Final    pH, UA 07/17/2020 6.0   Final    Comment: Urine pH less than 5.0 or greater than 8.0 may indicate sample adulteration. Another sample should be collected if clinically  indicated.  Drug Screen Comment: 07/17/2020 see below   Final    Comment: This method is a screening test to detect only these drug  classes as part of a medical workup. Confirmatory testing  by another method should be ordered if clinically indicated.       SARS-CoV-2, NAAT 07/17/2020 Not Detected  Not Detected Final    Comment: Rapid NAAT:   Negative results should be treated as presumptive and,  if inconsistent with clinical signs and symptoms or necessary for  patient management, should be tested with an alternative molecular  assay. Negative results do not preclude SARS-CoV-2 infection and  should not be used as the sole basis for patient management decisions. This test has been authorized by the FDA under an Emergency Use  Authorization (EUA) for use by authorized laboratories. Fact sheet for Healthcare Providers:  [email or web address]  Fact sheet for Patients: [email or web address]    METHODOLOGY: Isothermal Nucleic Acid Amplification      TSH 07/16/2020 1.88  0.27 - 4.20 uIU/mL Final            Medications  Current Facility-Administered Medications: nicotine polacrilex (NICORETTE) gum 4 mg, 4 mg, Oral, PRN  acetaminophen (TYLENOL) tablet 650 mg, 650 mg, Oral, Q4H PRN  ibuprofen (ADVIL;MOTRIN) tablet 400 mg, 400 mg, Oral, Q6H PRN  magnesium hydroxide (MILK OF MAGNESIA) 400 MG/5ML suspension 30 mL, 30 mL, Oral, Daily PRN  haloperidol (HALDOL) tablet 10 mg, 10 mg, Oral, Q6H PRN **OR** haloperidol lactate (HALDOL) injection 10 mg, 10 mg, Intramuscular, Q6H PRN  diphenhydrAMINE (BENADRYL) injection 50 mg, 50 mg, Intramuscular, Q6H PRN **OR** diphenhydrAMINE (BENADRYL) tablet 50 mg, 50 mg, Oral, Q6H PRN  traZODone (DESYREL) tablet 100 mg, 100 mg, Oral, Nightly  OLANZapine (ZYPREXA) tablet 10 mg, 10 mg, Oral, Daily  divalproex (DEPAKOTE ER) extended release tablet 500 mg, 500 mg, Oral, Daily  LORazepam (ATIVAN) tablet 2 mg, 2 mg, Oral, Q6H PRN **OR** LORazepam (ATIVAN) injection 2 mg, 2 mg, Intramuscular, Q6H PRN  losartan (COZAAR) tablet 50 mg, 50 mg, Oral, Daily    ASSESSMENT AND PLAN    Principal Problem:    Bipolar affective disorder, current episode manic with psychotic symptoms (HCC)  Active Problems:    Cannabis abuse  Resolved Problems:    * No resolved hospital problems. *       1. Patient's symptoms are improving  2. Probable

## 2020-07-18 NOTE — PROGRESS NOTES
Pt awake and pleasant this morning. He is bright and social with peers and staff. Coffee provided. Watching tv denies needs no s/s of distress. Will monitor.

## 2020-07-18 NOTE — PLAN OF CARE
Problem: Altered Mood, Psychotic Behavior:  Goal: Able to verbalize reality based thinking  Description: Able to verbalize reality based thinking  7/18/2020 1043 by Ricardo Arceo RN  Outcome: Ongoing  Met with client to complete assessment. Reports \" I have done research on mental breakdown and mental break thru. I am having a break thru. \" reports living with my wife and 3 daughters. \" my wife thinks something is wrong. I dont. \" client reports, \" this has never happened before, Im just winging it right now. \" denies SI/HI. Hyper verbal encouraged to continue to attend group.  Limited insight

## 2020-07-18 NOTE — BH NOTE
Talking on phone with wife, telling her, \" I dont want to take meds that mess with my mind, this is super wild\"

## 2020-07-19 LAB
ESTIMATED AVERAGE GLUCOSE: 88.2 MG/DL
HBA1C MFR BLD: 4.7 %

## 2020-07-19 PROCEDURE — 6370000000 HC RX 637 (ALT 250 FOR IP): Performed by: PSYCHIATRY & NEUROLOGY

## 2020-07-19 PROCEDURE — 1240000000 HC EMOTIONAL WELLNESS R&B

## 2020-07-19 RX ADMIN — TRAZODONE HYDROCHLORIDE 100 MG: 100 TABLET ORAL at 23:33

## 2020-07-19 RX ADMIN — LOSARTAN POTASSIUM 50 MG: 25 TABLET, FILM COATED ORAL at 09:42

## 2020-07-19 RX ADMIN — NICOTINE POLACRILEX 4 MG: 4 GUM, CHEWING BUCCAL at 22:34

## 2020-07-19 RX ADMIN — OLANZAPINE 10 MG: 10 TABLET, FILM COATED ORAL at 09:42

## 2020-07-19 ASSESSMENT — PAIN SCALES - WONG BAKER
WONGBAKER_NUMERICALRESPONSE: 0
WONGBAKER_NUMERICALRESPONSE: 0

## 2020-07-19 NOTE — GROUP NOTE
Group Therapy Note    Date: 7/19/2020    Group Start Time: 10:30 AM  Group End Time: 11:30 AM  Group Topic: Psychoeducation    [address] [facility]        Group Therapy Note    Attendees: 7       Patient's Goal:  Pt will participate in group discussion about the movie, \"Click\", and learn how the main theme of the movie applies to their life conflicts. Pt will be given handout with life area conflicts listed and learn how to prioritize those areas. Notes:  Pt was very supportive of other group members and discussed how he is conflicted between work and manifesting his passion. Status After Intervention:  Improved    Participation Level:  Active Listener and Interactive    Participation Quality: Appropriate, Attentive, Sharing and Supportive      Speech:  normal      Thought Process/Content: Logical      Affective Functioning: Congruent      Mood: euthymic      Level of consciousness:  Alert and Oriented x4      Response to Learning: Able to verbalize current knowledge/experience, Able to verbalize/acknowledge new learning and Progressing to goal      Endings: None Reported    Modes of Intervention: Support, Socialization, Exploration, Problem-solving and Activity      Discipline Responsible: /Counselor      Signature:  MICHELLE Olsen

## 2020-07-19 NOTE — PLAN OF CARE
Problem: Altered Mood, Psychotic Behavior:  Goal: Able to verbalize reality based thinking  Description: Able to verbalize reality based thinking  Outcome: Ongoing  Note: Pt has been bright, visible and social on the unit. Denies SI/HI/AVH. Refusing \"mind altering drugs\" such as his Depakote, stating \"My mind is fine, I wont take anything to change the chemicals in my body. \" Pt has no insight into his illness. Pt stated to his wife on the phone, \"I know what to say now. I know that I need to watch what I say to certain people. That's why I am in here. \"

## 2020-07-19 NOTE — GROUP NOTE
Group Therapy Note    Date: 7/19/2020    Group Start Time: 1330  Group End Time: 1430  Group Topic: [address]        Group Therapy Note    Attendees: 11    Patient's Goal: to engage in identifying positive and negative coping skills during group discussion and valente analysis intervention to increase identification and encourage utilization of positive coping skills, increase socialization, and improve mood. Notes: Johan Velasco actively engaged in group throughout. Pt identified multiple positive and negative coping skills and identified coping skills within music utilized. Johan Velasco positively socialized with peers, participated in processing discussion, and verbalized learning. Pt received a list of positive coping skills for reference. Status After Intervention:  Improved    Participation Level:  Active Listener and Interactive    Participation Quality: Appropriate, Attentive, Sharing and Supportive      Speech:  normal      Thought Process/Content: Logical  Linear      Affective Functioning: Constricted/Restricted      Mood: depressed      Level of consciousness:  Alert and Attentive      Response to Learning: Able to verbalize current knowledge/experience, Able to verbalize/acknowledge new learning and Progressing to goal      Endings: None Reported    Modes of Intervention: Education, Support, Socialization, Exploration, Clarifying, Problem-solving, Activity and Media      Discipline Responsible: Psychoeducational Specialist      Signature:  AKBAR Doe

## 2020-07-20 PROCEDURE — 1240000000 HC EMOTIONAL WELLNESS R&B

## 2020-07-20 PROCEDURE — 6370000000 HC RX 637 (ALT 250 FOR IP): Performed by: PSYCHIATRY & NEUROLOGY

## 2020-07-20 PROCEDURE — 97165 OT EVAL LOW COMPLEX 30 MIN: CPT

## 2020-07-20 PROCEDURE — 99233 SBSQ HOSP IP/OBS HIGH 50: CPT | Performed by: PSYCHIATRY & NEUROLOGY

## 2020-07-20 PROCEDURE — 97535 SELF CARE MNGMENT TRAINING: CPT

## 2020-07-20 RX ORDER — DIVALPROEX SODIUM 500 MG/1
500 TABLET, EXTENDED RELEASE ORAL EVERY EVENING
Status: DISCONTINUED | OUTPATIENT
Start: 2020-07-21 | End: 2020-07-22 | Stop reason: HOSPADM

## 2020-07-20 RX ORDER — OLANZAPINE 10 MG/1
10 TABLET ORAL NIGHTLY
Status: DISCONTINUED | OUTPATIENT
Start: 2020-07-21 | End: 2020-07-22 | Stop reason: HOSPADM

## 2020-07-20 RX ORDER — DIVALPROEX SODIUM 500 MG/1
500 TABLET, EXTENDED RELEASE ORAL DAILY
Status: DISCONTINUED | OUTPATIENT
Start: 2020-07-20 | End: 2020-07-20

## 2020-07-20 RX ADMIN — LOSARTAN POTASSIUM 50 MG: 25 TABLET, FILM COATED ORAL at 09:13

## 2020-07-20 RX ADMIN — NICOTINE POLACRILEX 4 MG: 4 GUM, CHEWING BUCCAL at 12:55

## 2020-07-20 RX ADMIN — TRAZODONE HYDROCHLORIDE 100 MG: 100 TABLET ORAL at 21:40

## 2020-07-20 RX ADMIN — DIVALPROEX SODIUM 500 MG: 500 TABLET, EXTENDED RELEASE ORAL at 09:13

## 2020-07-20 RX ADMIN — NICOTINE POLACRILEX 4 MG: 4 GUM, CHEWING BUCCAL at 18:11

## 2020-07-20 RX ADMIN — NICOTINE POLACRILEX 4 MG: 4 GUM, CHEWING BUCCAL at 16:46

## 2020-07-20 RX ADMIN — NICOTINE POLACRILEX 4 MG: 4 GUM, CHEWING BUCCAL at 21:09

## 2020-07-20 RX ADMIN — OLANZAPINE 10 MG: 10 TABLET, FILM COATED ORAL at 09:13

## 2020-07-20 ASSESSMENT — PAIN SCALES - WONG BAKER
WONGBAKER_NUMERICALRESPONSE: 0
WONGBAKER_NUMERICALRESPONSE: 0

## 2020-07-20 NOTE — GROUP NOTE
Group Therapy Note    Date: 7/20/2020    Group Start Time: 1115  Group End Time: 1210  [facility] [address] [facility]        Group [facility] Note    Attendees: 5         Patient's Goal:  Patient will complete worksheet on acceptance. Will discuss how acceptance in life contributes to positive mental health. Notes:  Patient engaged in group. Completed the worksheet and discussed. He talked about the need to accept people as they are instead of trying to change them. Status After Intervention:  Improved    Participation Level:  Active Listener and Interactive    Participation Quality: Appropriate, Attentive, Sharing and Supportive    Speech:  normal    Thought Process/Content: Logical Linear    Affective Functioning: Congruent    Mood: anxious and depressed    Level of consciousness:  Oriented x4    Response to Learning: Able to verbalize current knowledge/experience    Endings: None Reported    Modes of Intervention: Education, Support, Socialization and Exploration    Discipline Responsible: /Counselor    Signature:  Aníbal Hayden [facility]

## 2020-07-20 NOTE — BH NOTE
Ate morning meal in dinning area and then did attend the community meeting. Discussed scheduled medications and compliant with these medications.

## 2020-07-20 NOTE — PROGRESS NOTES
Comments: + interactions, + contribution, and + engagement.   Met Daily Goal      Time: 2030-2130      Type of Group: Wrap-up/Relaxation      Level of Participation: 11/22

## 2020-07-20 NOTE — PLAN OF CARE
Problem: Altered Mood, Psychotic Behavior:  Goal: Absence of self-harm  Description: Absence of self-harm  7/20/2020 1655 by Karen Reyes LPN  Outcome: Met This Shift  Note: Denies having self harm thoughts and contracts for safety. 7/20/2020 0647 by Rafi Renteria RN  Outcome: Ongoing     Problem: Altered Mood, Psychotic Behavior:  Goal: Compliance with prescribed medication regimen will improve  Description: Compliance with prescribed medication regimen will improve  Outcome: Met This Shift  Note: Reviewed scheduled medication and was compliant with med administration. Has attended groups and social in milieu. Questioned why there were discharges and he was not one of those. Accepted when told each case is different. Less manic this afternoon and able to sit and watch TV with peers.

## 2020-07-20 NOTE — BH NOTE
[facility]  Day 3 Interdisciplinary Treatment Plan NOTE    Review Date & Time: 07/20/20 0920    Patient was not in treatment team    Admission Type:   Admission Type: Involuntary    Reason for admission:  Reason for Admission: Patient psychotic, and experiencing grandiose delusions. Patient believes he has ideas that will change the world. Patient wants to speak tp Marcia about his ideas. Estimated Length of Stay Update:  2-3 days; hold up Wednesday   Estimated Discharge Date Update: 7/22-7/23    PATIENT STRENGTHS:  Patient Strengths Strengths: Positive Support  Patient Strengths and Limitations:Limitations: (SEFERINO)  Addictive Behavior:Addictive Behavior  In the past 3 months, have you felt or has someone told you that you have a problem with:  : (SEFERINO)  Do you have a history of Chemical Use?: (SEFERINO)  Do you have a history of Alcohol Use?: (SEFERINO)  Do you have a history of Street Drug Abuse?: (SEFERINO)  Histroy of Prescripton Drug Abuse?: (SEFERINO)  Medical Problems:  Past Medical History:   Diagnosis Date    Anxiety        Risk:  Fall RiskTotal: 57  Nael Scale Nael Scale Score: 22  BVC Total: 0  Change in scores none.  Changes to plan of Care none    Status EXAM:   Status and Exam  Normal: No  Facial Expression: Brightened, Exaggerated  Affect: Congruent  Level of Consciousness: Alert  Mood:Normal: No  Mood: (brighter)  Motor Activity:Normal: Yes  Motor Activity: Increased  Interview Behavior: Cooperative  Preception: [place] to Person, [place] Night to Time, [place] to Place, [place] to Situation  Attention:Normal: Yes  Attention: Unable to Concentrate  Thought Processes: Tangential  Thought Content:Normal: Yes  Thought Content: (none noted)  Hallucinations: None  Delusions: No  Delusions: Grandeur  Memory:Normal: No  Memory: Poor Recent, Poor Remote  Insight and Judgment: No  Insight and Judgment: Poor Judgment, Poor Insight  Present Suicidal Ideation: No  Present Homicidal Ideation: No    Daily Assessment Last Entry:   Daily Sleep (WDL): Exceptions to WDL         Patient Currently in Pain: Denies  Daily Nutrition (WDL): Within Defined Limits  Appetite Change: Normal for patient  Barriers to Nutrition: None  Level of Assistance: Independent/Self    Patient Monitoring:  Frequency of Checks: 4 times per hour, close    Psychiatric Symptoms:   Depression Symptoms  Depression Symptoms: No problems reported or observed. Anxiety Symptoms  Anxiety Symptoms: No problems reported or observed. Ruth Symptoms  Ruth Symptoms: Less need to sleep, Poor judgment     Psychosis Symptoms  Delusion Type: No problems reported or observed. Suicide Risk CSSR-S:  1) Within the past month, have you wished you were dead or wished you could go to sleep and not wake up? : No  2) Have you actually had any thoughts of killing yourself? : No  6) Have you ever done anything, started to do anything, or prepared to do anything to end your life?: No  Change in Result none Change in Plan of care none      EDUCATION:   Learner Progress Toward Treatment Goals: Reviewed results and recommendations of this team and Reviewed group plan and strategies    Method: Individual    Outcome: Verbalized understanding    PATIENT GOALS: Speak only when it's of added value.     PLAN/TREATMENT RECOMMENDATIONS UPDATE: Continue w/ current tx    GOALS UPDATE:   Time frame for Short-Term Goals: 1-2 days      Eve Ron RN

## 2020-07-20 NOTE — PROGRESS NOTES
spending time with family/friends, smoking marijuana. Medication Management:  \"I ran out. \"  \"I don't really need that sh*t anyways. \"  Health Management:  Pt. Reports that he does not have a PCP, Psychiatrist or therapist.  Social Network:  8 close friends, Mom, Dad, two sisters, grandmother, cousin, uncles  Stressors:  1. Money. 2.  Communication. 3.  The dog eat dog world. Coping Skills:  deep breathing, journaling, brain storming, smoking marijuana, video games. Pain  []Yes  [x]No  Rating:  Location:  Pain Medicine Status: [x] Denies need  [] Pain med requested  [] RN notified. Cognition    A&Ox person, date, place. Pt. Disoriented to situation. Patient appropriate and cooperative. Follows []1 step and [x] 3 step commands. Upper Extremity ROM:    WFL, pt able to perform all bed mobility, transfers, and gait without ROM limitation. Upper Extremity Strength:    WFL, pt able to perform all bed mobility, transfers, and gait without strength limitation. Upper Extremity Sensation:    No apparent deficits. Upper Extremity Proprioception:    No apparent deficits. Skin Integrity:  Intact     Coordination and Tone:  WFL    Balance  Static Sitting:  [x] Good [] Fair [] Poor  Dynamic Sitting:  [x] Good [] Fair [] Poor  Static Standing: [x] Good [] Fair [] Poor  Dynamic Standing: [x] Good [] Fair [] Poor    Bed mobility:  Independent  Supine to sit:  Sit to supine:  Scooting to head of bed:  Scooting in sitting:  Rolling:  Bridging:    Transfers:  Independent  Sit to stand:  Stand to sit:  Bed/Chair to/from toilet:    Self Care: Independent  Toileting:  Grooming:  Dressing:    Exercise / Activities Initiated:   Pt. Educated on role of OT. Pt. Participated in:  Eval, ADL Retraining, ACLS and safety. Assessment of Deficits:   Pt demonstrated decreased activity tolerance, decreased safety awareness, decreased cognition and decreased ADL/IADL status.     Pt. Limited during evaluation by decreased cognition. At end of evaluation, pt. In gathering room. Goal(s) : To be met in 3 Visits:  1). Pt. To complete ACLS. 2). Pt. To verbalize understanding of sleep hygiene education. To be met in 5 Visits:  1). Pt. To complete 1 SMART long term goal and 2 SMART short term goals with mod assist.  2). Pt. To verbalize 3 new coping skills. 3). Pt. To complete interest check list.    4). Pt. To verbalize understanding of 3 communication styles. 5). Pt. To complete wellness plan. 6). Pt. To complete a daily schedule of healthy activities/routines with mod assist.   7). Pt. To identify 2 memory strategies to take medications as prescribed. Rehabilitation Potential:  Good for goals listed above. Strengths for achieving goals include:  PLOF  Barriers to achieving goals include:  Decreased Cognition     Plan: To be seen 2-5x/week while in acute care setting for therapeutic exercises/act, ADL retraining, NMR and patient/caregiver ed/instruction.      Timed Code Treatment Minutes:   26  minutes    Total Treatment Time:    36  minutes    Signature and License Number      Estil Manual, OTR/L  #[id number]      If patient discharges from this facility prior to next visit, this note will serve as the Discharge Summary

## 2020-07-20 NOTE — PROGRESS NOTES
Department of Psychiatry  Student Physician Assistant Progress Note    Patient's chart was reviewed and collaborated with  about the treatment plan. SUBJECTIVE:    Patient is feeling better. Suicidal ideation:  denies SI/HI/AVH. Patient does not have medication side effects. Spoke with patient for over 60 minutes today. He was tearful when talking about this and said she was concerned for him. Patient was talking on the phone with his mother this morning, but it was difficult to understand what they talked about during the call. He said his mom asked to speak to the nurse before speaking to him and he felt she would not have done that if she truly trusted him. Parents recently went through a divorce and father has not been very involved with children since. This has been a difficult transition for him. States he is feeling better and more rational. Admits his thought process and plans to go by helicopter to meet [place] on Thur-Fri were \"psychotic\" and \"irrational\" and that he now plans to start a company to try and build a reputation over a time span of potentially 10 years in order to have a way to meet [employer] and present his ideas to after sending them and hoping Bernard reviews them. He is still very energetic and interviews are still disorganized. He took Zyprexa and Depakote this morning, but still does not like the idea and wants to discuss them. I talked to him about the Dx of Bipolar disorder and about sree. He verbalized understanding the common Sx he was displaying, but said \"I do not have a previous Hx of being bipolar\". Suggested talking with Dr. Maricruz Prado for further understanding and explanation of Dx and meds. He was agreeable. ROS: Patient has new complaints: no  Sleeping adequately:  Yes   Appetite adequate:  Yes  Attending groups: Yes  Visitors:Yes (spoke with mother this morning)    OBJECTIVE    Physical  VITALS:  BP (!) 152/95   Pulse 91   Temp 98.4 °F (36.9 °C) (Temporal)   Resp 16   Ht 6' (1.829 m)   Wt 220 lb (99.8 kg)   SpO2 98%   BMI 29.84 kg/m²     Mental Status Examination:  Patients appearance was good grooming/hygeine. Thoughts are more illogical and rapid. Homicidal ideations none. No abnormal movements, tics or mannerisms. Memory intact [place] 0. Concentration Fair. Alert and oriented X 4. Insight and Judgement impaired insight. Patient was cooperative.  Patient gait normal. Mood euphoric, affect elevated affect Hallucinations Absent, suicidal ideations no specific plan to harm self Speech pressured  Data  Labs:   Admission on 07/16/2020   Component Date Value Ref Range Status    WBC 07/16/2020 9.1  4.0 - 11.0 K/uL Final    RBC 07/16/2020 5.56  4.20 - 5.90 M/uL Final    Hemoglobin 07/16/2020 16.2  13.5 - 17.5 g/dL Final    Hematocrit 07/16/2020 46.8  40.5 - 52.5 % Final    MCV 07/16/2020 84.2  80.0 - 100.0 fL Final    MCH 07/16/2020 29.2  26.0 - 34.0 pg Final    MCHC 07/16/2020 34.6  31.0 - 36.0 g/dL Final    RDW 07/16/2020 13.3  12.4 - 15.4 % Final    Platelets 18/80/2832 229  135 - 450 K/uL Final    MPV 07/16/2020 8.6  5.0 - 10.5 fL Final    Neutrophils % 07/16/2020 75.9  % Final    Lymphocytes % 07/16/2020 16.1  % Final    Monocytes % 07/16/2020 7.2  % Final    Eosinophils % 07/16/2020 0.5  % Final    Basophils % 07/16/2020 0.3  % Final    Neutrophils Absolute 07/16/2020 6.9  1.7 - 7.7 K/uL Final    Lymphocytes Absolute 07/16/2020 1.5  1.0 - 5.1 K/uL Final    Monocytes Absolute 07/16/2020 0.7  0.0 - 1.3 K/uL Final    Eosinophils Absolute 07/16/2020 0.0  0.0 - 0.6 K/uL Final    Basophils Absolute 07/16/2020 0.0  0.0 - 0.2 K/uL Final    Sodium 07/16/2020 139  136 - 145 mmol/L Final    Potassium 07/16/2020 3.9  3.5 - 5.1 mmol/L Final    Chloride 07/16/2020 101  99 - 110 mmol/L Final    CO2 07/16/2020 26  21 - 32 mmol/L Final    Anion Gap 07/16/2020 12  3 - 16 Final    Glucose 07/16/2020 111* 70 - 99 mg/dL Final    BUN 07/16/2020 11  7 - 20 mg/dL Final    CREATININE 07/16/2020 1.0  0.9 - 1.3 mg/dL Final    GFR Non- 07/16/2020 >60  >60 Final    Comment: >60 mL/min/1.73m2 EGFR, calc. for ages 25 and older using the  MDRD formula (not corrected for weight), is valid for stable  renal function.  GFR  07/16/2020 >60  >60 Final    Comment: Chronic Kidney Disease: less than 60 ml/min/1.73 sq.m. Kidney Failure: less than 15 ml/min/1.73 sq.m. Results valid for patients 18 years and older.  Calcium 07/16/2020 9.4  8.3 - 10.6 mg/dL Final    Total Protein 07/16/2020 6.9  6.4 - 8.2 g/dL Final    Alb 07/16/2020 4.9  3.4 - 5.0 g/dL Final    Albumin/Globulin Ratio 07/16/2020 2.5* 1.1 - 2.2 Final    Total Bilirubin 07/16/2020 1.3* 0.0 - 1.0 mg/dL Final    Alkaline Phosphatase 07/16/2020 51  40 - 129 U/L Final    ALT 07/16/2020 18  10 - 40 U/L Final    AST 07/16/2020 20  15 - 37 U/L Final    Globulin 07/16/2020 2.0  g/dL Final    Acetaminophen Level 07/16/2020 <5* 10 - 30 ug/mL Final    Comment: Therapeutic Range: 10.0-30.0 ug/mL  Toxic: >=025 ug/mL      Salicylate, Serum 82/84/9976 <0.3* 15.0 - 30.0 mg/dL Final    Comment: Therapeutic Range: 15.0-30.0 mg/dL  Toxic: >30.0 mg/dL      Ethanol Lvl 07/16/2020 None Detected  mg/dL Final    Comment:    None Detected  Conversion factor:  100 mg/dl = .100 g/dl  For Medical Purposes Only      Amphetamine Screen, Urine 07/17/2020 Neg  Negative <1000ng/mL Final    Barbiturate Screen, Ur 07/17/2020 Neg  Negative <200 ng/mL Final    Benzodiazepine Screen, Urine 07/17/2020 Neg  Negative <200 ng/mL Final    Cannabinoid Scrn, Ur 07/17/2020 POSITIVE* Negative <50 ng/mL Final    Cocaine Metabolite Screen, Urine 07/17/2020 Neg  Negative <300 ng/mL Final    Opiate Scrn, Ur 07/17/2020 Neg  Negative <300 ng/mL Final    Comment: \"Therapeutic levels of pain medication, especially oxycontin and synthetic  opioids, may not be detected by this Methodology.  Pain management screen  panel  Drug panel-PM-Hi Res Ur, Interp (PAIN) should be considered for drug  monitoring \".  PCP Screen, Urine 07/17/2020 Neg  Negative <25 ng/mL Final    Methadone Screen, Urine 07/17/2020 Neg  Negative <300 ng/mL Final    Propoxyphene Scrn, Ur 07/17/2020 Neg  Negative <300 ng/mL Final    Oxycodone Urine 07/17/2020 Neg  Negative <100 ng/ml Final    pH, UA 07/17/2020 6.0   Final    Comment: Urine pH less than 5.0 or greater than 8.0 may indicate sample adulteration. Another sample should be collected if clinically  indicated.  Drug Screen Comment: 07/17/2020 see below   Final    Comment: This method is a screening test to detect only these drug  classes as part of a medical workup. Confirmatory testing  by another method should be ordered if clinically indicated.  SARS-CoV-2, NAAT 07/17/2020 Not Detected  Not Detected Final    Comment: Rapid NAAT:   Negative results should be treated as presumptive and,  if inconsistent with clinical signs and symptoms or necessary for  patient management, should be tested with an alternative molecular  assay. Negative results do not preclude SARS-CoV-2 infection and  should not be used as the sole basis for patient management decisions. This test has been authorized by the FDA under an Emergency Use  Authorization (EUA) for use by authorized laboratories.     Fact sheet for Healthcare Providers:  Christina.trina  Fact sheet for Patients: Christina.trina    METHODOLOGY: Isothermal Nucleic Acid Amplification      TSH 07/16/2020 1.88  0.27 - 4.20 uIU/mL Final    Hemoglobin A1C 07/18/2020 4.7  See comment % Final    Comment: Comment:  Diagnosis of Diabetes: > or = 6.5%  Increased risk of diabetes (Prediabetes): 5.7-6.4%  Glycemic Control: Nonpregnant Adults: <7.0%                    Pregnant: <6.0%        eAG 07/18/2020 88.2  mg/dL Final    Cholesterol, Total 07/18/2020 146  0 - 199 mg/dL Final    Triglycerides 07/18/2020 111  0 - 150 mg/dL Final    HDL 07/18/2020 39* 40 - 60 mg/dL Final    LDL Calculated 07/18/2020 85  <100 mg/dL Final    VLDL Cholesterol Calculated 07/18/2020 22  Not Established mg/dL Final            Medications  Current Facility-Administered Medications: [START ON 7/21/2020] OLANZapine (ZYPREXA) tablet 10 mg, 10 mg, Oral, Nightly  [START ON 7/21/2020] divalproex (DEPAKOTE ER) extended release tablet 500 mg, 500 mg, Oral, QPM  nicotine polacrilex (NICORETTE) gum 4 mg, 4 mg, Oral, PRN  acetaminophen (TYLENOL) tablet 650 mg, 650 mg, Oral, Q4H PRN  ibuprofen (ADVIL;MOTRIN) tablet 400 mg, 400 mg, Oral, Q6H PRN  magnesium hydroxide (MILK OF MAGNESIA) 400 MG/5ML suspension 30 mL, 30 mL, Oral, Daily PRN  haloperidol (HALDOL) tablet 10 mg, 10 mg, Oral, Q6H PRN **OR** haloperidol lactate (HALDOL) injection 10 mg, 10 mg, Intramuscular, Q6H PRN  diphenhydrAMINE (BENADRYL) injection 50 mg, 50 mg, Intramuscular, Q6H PRN **OR** diphenhydrAMINE (BENADRYL) tablet 50 mg, 50 mg, Oral, Q6H PRN  traZODone (DESYREL) tablet 100 mg, 100 mg, Oral, Nightly  LORazepam (ATIVAN) tablet 2 mg, 2 mg, Oral, Q6H PRN **OR** LORazepam (ATIVAN) injection 2 mg, 2 mg, Intramuscular, Q6H PRN  losartan (COZAAR) tablet 50 mg, 50 mg, Oral, Daily    ASSESSMENT AND PLAN    Principal Problem:    Bipolar affective disorder, current episode manic with psychotic symptoms ([facility] [address]  Active Problems:    Cannabis abuse  Resolved Problems:    * No resolved hospital problems. *       1. Patient's symptoms are improving  2. Probable discharge is 2-3 days  3. Discharge planning is incomplete  4. Suicidal ideation is none  5. Total time with patient was 40 minutes and more than 50 % of that time was spent counseling the patient on their symptoms, treatment and expected goals. DOMINIC Del ToroS         Addendum to PA student note:  Pt seen, examined, and evaluated with PA student , who acted as my scribe for the above documentation. I have reviewed the current history, physical findings, labs, assessment and plan; and agree with note as documented.  Pratik Agee MD  Physician Psychiatry

## 2020-07-20 NOTE — BH NOTE
Approached this writer \"So can I call a ? \". \"If I took all this and burned it in a fire pit would I get in trouble\". \"This is fucking me up\". Explained he did have contact info if he felt the need to call a patient advocate. \"No, there's no number here\". Was able to review the paperwork he received on admission and locate the number.

## 2020-07-21 PROCEDURE — 97535 SELF CARE MNGMENT TRAINING: CPT

## 2020-07-21 PROCEDURE — 99233 SBSQ HOSP IP/OBS HIGH 50: CPT | Performed by: PSYCHIATRY & NEUROLOGY

## 2020-07-21 PROCEDURE — 6370000000 HC RX 637 (ALT 250 FOR IP): Performed by: PSYCHIATRY & NEUROLOGY

## 2020-07-21 PROCEDURE — 1240000000 HC EMOTIONAL WELLNESS R&B

## 2020-07-21 RX ORDER — POLYETHYLENE GLYCOL 3350 17 G
4 POWDER IN PACKET (EA) ORAL PRN
Status: DISCONTINUED | OUTPATIENT
Start: 2020-07-21 | End: 2020-07-22 | Stop reason: HOSPADM

## 2020-07-21 RX ADMIN — NICOTINE POLACRILEX 4 MG: 4 GUM, CHEWING BUCCAL at 08:41

## 2020-07-21 RX ADMIN — NICOTINE POLACRILEX 4 MG: 2 LOZENGE ORAL at 21:15

## 2020-07-21 RX ADMIN — OLANZAPINE 10 MG: 10 TABLET, FILM COATED ORAL at 21:15

## 2020-07-21 RX ADMIN — NICOTINE POLACRILEX 4 MG: 4 GUM, CHEWING BUCCAL at 06:48

## 2020-07-21 RX ADMIN — NICOTINE POLACRILEX 4 MG: 2 LOZENGE ORAL at 17:54

## 2020-07-21 RX ADMIN — NICOTINE POLACRILEX 4 MG: 2 LOZENGE ORAL at 14:10

## 2020-07-21 RX ADMIN — LOSARTAN POTASSIUM 50 MG: 25 TABLET, FILM COATED ORAL at 08:41

## 2020-07-21 RX ADMIN — DIVALPROEX SODIUM 500 MG: 500 TABLET, EXTENDED RELEASE ORAL at 17:25

## 2020-07-21 RX ADMIN — TRAZODONE HYDROCHLORIDE 100 MG: 100 TABLET ORAL at 22:54

## 2020-07-21 NOTE — GROUP NOTE
Group Therapy Note    Date: 7/21/2020    Group Start Time: 1000  Group End Time: 1100  Group Topic: Psychoeducation    [address] [facility]        Group Therapy Note    Attendees: 11         Patient's Goal:  Patients were invited to participate in a Cognitive Skills group on cognitive distortions. Patients received a handout on and were invited to discuss how to identify, discover, and challenge cognitive distortions. Patients then received a CBT Thought Record sheet and were asked to journal or to create an art piece on how to practice self-gace. At the end of session, patients were encouraged to share and process their work with the group. Notes: Jocy Roberts entered group session alf through, participated in art making, but stated he did not want to share his work and sat with his back facing away from a majority of group members. Status After Intervention:  Unchanged    Participation Level:  Active Listener    Participation Quality: Appropriate      Speech:  hesitant      Thought Process/Content: Linear      Affective Functioning: Exaggerated      Mood: anxious      Level of consciousness:  Alert and Preoccupied      Response to Learning: Able to verbalize current knowledge/experience, Able to verbalize/acknowledge new learning and Able to retain information      Endings: None Reported    Modes of Intervention: Education, Support, Socialization, Exploration, Activity and Media      Discipline Responsible: Psychoeducational Specialist      Signature:  Vivek Gage, [address] [facility]

## 2020-07-21 NOTE — BH NOTE
Group Therapy Note    Date: 7/21/2020    Group Start Time: 1000  Group End Time: 1100  Group Topic: Psychoeducation    [address] [facility]        Group Therapy Note    Attendees: 11         Patient's Goal:  Patients were invited to participate in a Cognitive Skills group on cognitive distortions. Patients received a handout on and were invited to discuss how to identify, discover, and challenge cognitive distortions. Patients then received a CBT Thought Record sheet and were asked to journal or to create an art piece on how to practice self-gace. At the end of session, patients were encouraged to share and process their work with the group. Notes: Jennifer Clayton appeared attentive to the information provided on the handouts, engaged in group conversation, and was pulled from group to meet with another staff member and did not return until the end of session. Status After Intervention:  Unchanged    Participation Level:  Active Listener and Interactive    Participation Quality: Appropriate, Attentive, Sharing and Supportive      Speech:  normal      Thought Process/Content: Logical      Affective Functioning: Congruent      Mood: euthymic      Level of consciousness:  Alert, Oriented x4 and Attentive      Response to Learning: Able to verbalize current knowledge/experience, Able to verbalize/acknowledge new learning, Able to retain information and Capable of insight      Endings: None Reported    Modes of Intervention: Education, Support, Socialization, Exploration, Activity and Media      Discipline Responsible: Psychoeducational Specialist      Signature:  Jessica Abraham, [address] [facility]

## 2020-07-21 NOTE — GROUP NOTE
Group Therapy Note    Date: 7/21/2020    Group Start Time: 1330  Group End Time: 1430  Group Topic: Psychoeducation    [address] [place] [address]        Group Therapy Note    Attendees: 9    Patient's Goal: to complete a strength developing worksheet and create a self-portrait incorporating identified developed strengths. Notes: Usman Carolina declined worksheet portion of group, stating \"I'm not going to do this because I'm an expert at identifying strengths and teaching this. \" Usman Carolina participated in art making portion of group. Usman Carolina created an art piece that incorporated his strengths. Usman Carolina shared his artwork with peers. Usman Carolina provided peers with positive feedback and support. Usman Carolina reported verbal understanding of education topic. Status After Intervention:  Improved    Participation Level:  Active Listener and Interactive    Participation Quality: Appropriate, Attentive, Sharing and Supportive      Speech:  normal      Thought Process/Content: Logical  Linear      Affective Functioning: Congruent      Mood: anxious and depressed      Level of consciousness:  Alert, Oriented x4 and Attentive      Response to Learning: Able to verbalize current knowledge/experience, Able to verbalize/acknowledge new learning and Progressing to goal      Endings: None Reported    Modes of Intervention: Education, Support, Socialization, Exploration, Clarifying, Problem-solving and Activity      Discipline Responsible: Psychoeducational Specialist      Signature:  Keyla Gaviria, [address]

## 2020-07-21 NOTE — PROGRESS NOTES
Inpatient Occupational Therapy Treatment    Unit:  [facility]  Date:  7/21/2020  Patient Name:    Sheila Disla  Admitting diagnosis:  Psychosis, unspecified psychosis type [facility]) [F29]  Bipolar affective disorder, current episode manic with psychotic symptoms ([facility] [address] [F31.2]  Admit Date:  7/16/2020  Precautions/Restrictions/WB Status/ Lines/ Wounds/ Oxygen:  Up as tolerated  Treatment Time:  15:17-15:55  Treatment Number:  2    Subjective:  Pt. Received in gathering room and agreeable to therapy session. Discharge Recommendations:   []Home Independently  [x] Home with assist prn [] Home OT  []SNF  []ARU    DME needs for discharge:   NA     AM-PAC Score:    24     Home Health S4 Level: [x] NA   [] Level 1- Standard  []  Level 2- Social  [] Level 3- Safety  []  Level 4- Sick    ACLS:  Completed 7/20/2020  Mode 5.4  Engaging Abilities and Following Safety Precautions When the Person Can Engage in Self-directed Learning     DESCRIPTION:    14% Cognitive Assistance: The person may live alone and work in a job with a wide margin of error. 14% standby cognitive assistance is needed to anticipate hazards and prevent industrial accidents. Individual preferences for improving the appearance of activities can be honored. 2% Physical Assistance is needed for fine motor actions. Pain  []Yes  [x]No  Rating:  Location:  Pain Medicine Status: [x] Denies need  [] Pain med requested  [] RN notified. Cognition    A&Ox person, date, place. Pt. Disoriented to situation. Patient appropriate and cooperative. Follows []1 step and [x] 3 step commands. Pt. Presents as grandiose with decreased insight into limitations.     ADL Retraining:  Interest Checklist [facility] (Adapted Version)  Health and Fitness:  3 likes, 1 want to try  Sports:  9 likes; 1 want to try  Creative:  3 likes/ok; 1 want to try  Productivity at home:  5 likes/ok  Leisure at home:  9 likes/ok  Social:  3 likes  Outdoor Pursuits:  4 likes/ok; 2 want to try  Out

## 2020-07-21 NOTE — GROUP NOTE
Group Therapy Note    Date: 7/21/2020    Group Start Time: 1115  Group End Time: 1200  [facility]        Group Therapy Note    Attendees: 9         Patient's Goal:  Patient will complete worksheet Change Your Perspective/Change Your LIfe. Will discuss how perspective change influences improved mood and outlook and identify how to apply to life circumstances. Notes:  Patient attended group. Completed the worksheet and discussed. Patient verbalized an understanding of the topic and provided 3 examples of how he could look at life differently. Status After Intervention:  Improved    Participation Level:  Active Listener and Interactive    Participation Quality: Appropriate, Attentive, Sharing and Supportive    Speech:  normal    Thought Process/Content: Logical  Linear    Affective Functioning: Congruent    Mood: anxious and depressed    Level of consciousness:  Oriented x4    Response to Learning: Able to verbalize current knowledge/experience and Capable of insight    Endings: None Reported    Modes of Intervention: Education, Support, Socialization and Exploration    Discipline Responsible: /Counselor     Signature:  Dee Dee Kelly, [facility]

## 2020-07-21 NOTE — PROGRESS NOTES
Patient requested and received nicotine gum 4 mg po for c/o nicotine withdraw. Patient states that the gum is effective to decrease cravings.

## 2020-07-21 NOTE — BH NOTE
Group Therapy Note    Date: 7/20/2020  Start Time: 20:00  End Time:  21:00  Number of Participants: 12    Type of Group: Recreational  Wrap up    Noah Interiano Information  Module Name:  /  Session Number:  /    Patient's Goal:  Read     Notes:  Goal completed per pt    Status After Intervention:  Unchanged    Participation Level:  Active Listener and Interactive    Participation Quality: Appropriate      Speech:  normal      Thought Process/Content: Logical      Affective Functioning: Blunted      Mood: anxious      Level of consciousness:  Alert      Response to Learning: Able to change behavior      Endings: None Reported    Modes of Intervention: Socialization and Problem-solving      Discipline Responsible: Behavorial Health Tech      Signature:  Myrtle Romero

## 2020-07-21 NOTE — PROGRESS NOTES
Department of Psychiatry  Physician Assistant Student Progress Note    Chief Complaint: Bipolar I disorder with psychotic features  Patient's chart was reviewed and collaborated with  about the treatment plan. SUBJECTIVE:    Patient is feeling better and denies SI/HI/AVH. Reports his previous plans and thoughts were irrational when he initially presented to [facility]. He has been compliant with his meds the last two days and has noticeably improved. Discussed that his hold was up tomorrow and agreed will DC tomorrow. He is eager to leave and is going to arrange a ride. He had not decided who he wanted to pick him up, but will determine that today and arrange. Hx of HTN and BP has been elevated since admission, restarted on Losartan 50 mg daily. BP today elevated at 148/90. Medical consulted to review HTN w/ pt.         ROS: Patient has new complaints: no  Sleeping adequately:  Yes   Appetite adequate: Yes  Attending groups: Yes  Visitors:No    OBJECTIVE    Physical  VITALS:  BP (!) 148/90 Comment: REPORTED TO   Pulse 94   Temp 97.8 °F (36.6 °C) (Temporal)   Resp 18   Ht 6' (1.829 m)   Wt 220 lb (99.8 kg)   SpO2 96%   BMI 29.84 kg/m²     Mental Status Examination:  Patients appearance was good grooming/hygeine. Thoughts are Flight of ideas. Homicidal ideations none. No abnormal movements, tics or mannerisms. Memory intact Aims 0. Concentration Fair. Alert and oriented X 4. Insight and Judgement impaired insight. Patient was cooperative.  Patient gait normal. Mood euphoric, affect elevated affect Hallucinations Absent, suicidal ideations no specific plan to harm self Speech pressured  Data  Labs:   Admission on 07/16/2020   Component Date Value Ref Range Status    WBC 07/16/2020 9.1  4.0 - 11.0 K/uL Final    RBC 07/16/2020 5.56  4.20 - 5.90 M/uL Final    Hemoglobin 07/16/2020 16.2  13.5 - 17.5 g/dL Final    Hematocrit 07/16/2020 46.8  40.5 - 52.5 % Final    MCV 07/16/2020 84.2  80.0 - 100.0 fL Final    MCH 07/16/2020 29.2  26.0 - 34.0 pg Final    MCHC 07/16/2020 34.6  31.0 - 36.0 g/dL Final    RDW 07/16/2020 13.3  12.4 - 15.4 % Final    Platelets 99/82/9598 229  135 - 450 K/uL Final    MPV 07/16/2020 8.6  5.0 - 10.5 fL Final    Neutrophils % 07/16/2020 75.9  % Final    Lymphocytes % 07/16/2020 16.1  % Final    Monocytes % 07/16/2020 7.2  % Final    Eosinophils % 07/16/2020 0.5  % Final    Basophils % 07/16/2020 0.3  % Final    Neutrophils Absolute 07/16/2020 6.9  1.7 - 7.7 K/uL Final    Lymphocytes Absolute 07/16/2020 1.5  1.0 - 5.1 K/uL Final    Monocytes Absolute 07/16/2020 0.7  0.0 - 1.3 K/uL Final    Eosinophils Absolute 07/16/2020 0.0  0.0 - 0.6 K/uL Final    Basophils Absolute 07/16/2020 0.0  0.0 - 0.2 K/uL Final    Sodium 07/16/2020 139  136 - 145 mmol/L Final    Potassium 07/16/2020 3.9  3.5 - 5.1 mmol/L Final    Chloride 07/16/2020 101  99 - 110 mmol/L Final    CO2 07/16/2020 26  21 - 32 mmol/L Final    Anion Gap 07/16/2020 12  3 - 16 Final    Glucose 07/16/2020 111* 70 - 99 mg/dL Final    BUN 07/16/2020 11  7 - 20 mg/dL Final    CREATININE 07/16/2020 1.0  0.9 - 1.3 mg/dL Final    GFR Non- 07/16/2020 >60  >60 Final    Comment: >60 mL/min/1.73m2 EGFR, calc. for ages 25 and older using the  MDRD formula (not corrected for weight), is valid for stable  renal function.  GFR  07/16/2020 >60  >60 Final    Comment: Chronic Kidney Disease: less than 60 ml/min/1.73 sq.m. Kidney Failure: less than 15 ml/min/1.73 sq.m. Results valid for patients 18 years and older.       Calcium 07/16/2020 9.4  8.3 - 10.6 mg/dL Final    Total Protein 07/16/2020 6.9  6.4 - 8.2 g/dL Final    Alb 07/16/2020 4.9  3.4 - 5.0 g/dL Final    Albumin/Globulin Ratio 07/16/2020 2.5* 1.1 - 2.2 Final    Total Bilirubin 07/16/2020 1.3* 0.0 - 1.0 mg/dL Final    Alkaline Phosphatase 07/16/2020 51  40 - 129 U/L Final    ALT 07/16/2020 18  10 - 40 U/L Final  AST 07/16/2020 20  15 - 37 U/L Final    Globulin 07/16/2020 2.0  g/dL Final    Acetaminophen Level 07/16/2020 <5* 10 - 30 ug/mL Final    Comment: Therapeutic Range: 10.0-30.0 ug/mL  Toxic: >=719 ug/mL      Salicylate, Serum 36/77/6241 <0.3* 15.0 - 30.0 mg/dL Final    Comment: Therapeutic Range: 15.0-30.0 mg/dL  Toxic: >30.0 mg/dL      Ethanol Lvl 07/16/2020 None Detected  mg/dL Final    Comment:    None Detected  Conversion factor:  100 mg/dl = .100 g/dl  For Medical Purposes Only      Amphetamine Screen, Urine 07/17/2020 Neg  Negative <1000ng/mL Final    Barbiturate Screen, Ur 07/17/2020 Neg  Negative <200 ng/mL Final    Benzodiazepine Screen, Urine 07/17/2020 Neg  Negative <200 ng/mL Final    Cannabinoid Scrn, Ur 07/17/2020 POSITIVE* Negative <50 ng/mL Final    Cocaine Metabolite Screen, Urine 07/17/2020 Neg  Negative <300 ng/mL Final    Opiate Scrn, Ur 07/17/2020 Neg  Negative <300 ng/mL Final    Comment: \"Therapeutic levels of pain medication, especially oxycontin and synthetic  opioids, may not be detected by this Methodology. Pain management screen  panel  Drug panel-PM-Hi Res Ur, Interp (PAIN) should be considered for drug  monitoring \".  PCP Screen, Urine 07/17/2020 Neg  Negative <25 ng/mL Final    Methadone Screen, Urine 07/17/2020 Neg  Negative <300 ng/mL Final    Propoxyphene Scrn, Ur 07/17/2020 Neg  Negative <300 ng/mL Final    Oxycodone Urine 07/17/2020 Neg  Negative <100 ng/ml Final    pH, UA 07/17/2020 6.0   Final    Comment: Urine pH less than 5.0 or greater than 8.0 may indicate sample adulteration. Another sample should be collected if clinically  indicated.  Drug Screen Comment: 07/17/2020 see below   Final    Comment: This method is a screening test to detect only these drug  classes as part of a medical workup. Confirmatory testing  by another method should be ordered if clinically indicated.       SARS-CoV-2, NAAT 07/17/2020 Not Detected  Not Detected Final 50 mg, 50 mg, Intramuscular, Q6H PRN **OR** diphenhydrAMINE (BENADRYL) tablet 50 mg, 50 mg, Oral, Q6H PRN  traZODone (DESYREL) tablet 100 mg, 100 mg, Oral, Nightly  LORazepam (ATIVAN) tablet 2 mg, 2 mg, Oral, Q6H PRN **OR** LORazepam (ATIVAN) injection 2 mg, 2 mg, Intramuscular, Q6H PRN  losartan (COZAAR) tablet 50 mg, 50 mg, Oral, Daily    ASSESSMENT AND PLAN    Principal Problem:    Bipolar affective disorder, current episode manic with psychotic symptoms (HCC)  Active Problems:    Cannabis abuse  Resolved Problems:    * No resolved hospital problems. *       1. Bipolar I Disorder with psychotic features  -Patient's symptoms are improving, Cont Depakote 500 mg QHS and Zyprexa 10 mg QHS  -Probable discharge is tomorrow  -Discharge planning is incomplete  -Suicidal ideation is none  -Total time with patient was 40 minutes and more than 50 % of that time was spent counseling the patient on their symptoms, treatment and expected goals. 2. Uncontrolled HTN  - cont Losartan 50 mg daily  -consult medical         ASAD Del Toro         Addendum to FRANKO stark note:  Pt seen, examined, and evaluated with FRANKO stark , who acted as my scribe for the above documentation.  I have reviewed the current history, physical findings, labs, assessment and plan; and agree with note as documented.  Luis Leonardo MD  Physician Psychiatry

## 2020-07-21 NOTE — PLAN OF CARE
PT A&O, visible on unit attends community meeting and groups, calm cooperative with care, denies SI, HI, and AVH, no distress noted, social with peers and staff. Pt denies self harm and contracts for safety, will continue to monitor.

## 2020-07-21 NOTE — GROUP NOTE
Group Therapy Note    Date: 7/21/2020    Group Start Time: 1600  Group End Time: 1640  Group Topic: Healthy Living/Wellness    [place], [address] [facility]        Group Therapy Note    Attendees: 7       Discussed the importance of sleep and establishing a sleep routine. Patients given handouts on Insomnia and sleep tips.     Signature:  Chiqui Hugo RN

## 2020-07-22 VITALS
HEIGHT: 72 IN | SYSTOLIC BLOOD PRESSURE: 139 MMHG | HEART RATE: 105 BPM | RESPIRATION RATE: 16 BRPM | DIASTOLIC BLOOD PRESSURE: 93 MMHG | TEMPERATURE: 95.5 F | BODY MASS INDEX: 29.8 KG/M2 | OXYGEN SATURATION: 98 % | WEIGHT: 220 LBS

## 2020-07-22 PROCEDURE — 99239 HOSP IP/OBS DSCHRG MGMT >30: CPT | Performed by: PSYCHIATRY & NEUROLOGY

## 2020-07-22 PROCEDURE — 6370000000 HC RX 637 (ALT 250 FOR IP): Performed by: PSYCHIATRY & NEUROLOGY

## 2020-07-22 PROCEDURE — 5130000000 HC BRIDGE APPOINTMENT

## 2020-07-22 RX ORDER — OLANZAPINE 10 MG/1
10 TABLET ORAL NIGHTLY
Qty: 30 TABLET | Refills: 3 | Status: SHIPPED | OUTPATIENT
Start: 2020-07-22 | End: 2021-03-18

## 2020-07-22 RX ORDER — TRAZODONE HYDROCHLORIDE 100 MG/1
100 TABLET ORAL NIGHTLY
Qty: 30 TABLET | Refills: 0 | Status: SHIPPED | OUTPATIENT
Start: 2020-07-22 | End: 2021-12-06

## 2020-07-22 RX ORDER — DIVALPROEX SODIUM 500 MG/1
500 TABLET, EXTENDED RELEASE ORAL EVERY EVENING
Qty: 30 TABLET | Refills: 3 | Status: SHIPPED | OUTPATIENT
Start: 2020-07-22 | End: 2021-03-18

## 2020-07-22 RX ORDER — LOSARTAN POTASSIUM 50 MG/1
50 TABLET ORAL DAILY
Qty: 30 TABLET | Refills: 3 | Status: SHIPPED | OUTPATIENT
Start: 2020-07-22 | End: 2020-12-22

## 2020-07-22 RX ADMIN — NICOTINE POLACRILEX 4 MG: 2 LOZENGE ORAL at 08:28

## 2020-07-22 RX ADMIN — LOSARTAN POTASSIUM 50 MG: 25 TABLET, FILM COATED ORAL at 08:27

## 2020-07-22 NOTE — DISCHARGE SUMMARY
Discharge Summary   Admit Date: 7/16/2020   Discharge Date:    7/22/2020   Spent over 40 minutes with patient and staff on [facility]     Final Dx: axis I: Bipolar affective disorder, current episode manic with psychotic symptoms ([address]   Axis 2: deferred  Viji 3: See Medical History    And Present on Admission:   Bipolar affective disorder, current episode manic with psychotic symptoms ([address]   Cannabis abuse     Axis 4: no problems  Axis 5:  On Admission: 21-30 behavior considerably influenced by delusions or hallucinations OR serious impairment in judgment, communication OR inability to function in almost all areas At Discharge: 61-70 mild symptoms   All conditions on Axis 1 and Axis 2 and active problems on Axis 3 were treated while patient was hospitalized. [facility] Problems    Diagnosis Date Noted    Bipolar affective disorder, current episode manic with psychotic symptoms ([facility] [address] [F31.2] 07/17/2020    Cannabis abuse [F12.10]    )   Condition on DC  Mood and affect are stable and pt is not suicidal   VITALS:  BP (!) 139/93   Pulse 105   Temp 95.5 °F (35.3 °C) (Temporal)   Resp 16   Ht 6' (1.829 m)   Wt 220 lb (99.8 kg)   SpO2 98%   BMI 29.84 kg/m²   Brief Summary Present Illness      Psychiatric Evaluation       pt comes in with mobile crisis rep after being called by wife. Rep reports that pt has quit job, writing pages and pages of \"stuff\". Tonight wife found pt in closet with pistol thinking someone was trying to break in and steal his ideas.         Patient seen in room on Adult Behavioral Unit. Patient is a 30 y.o. male with a PMH of anxiety and depression who presents to [facility] for acute psychosis. Spoke to the wife and she said he has been acting \"different\" for about 6 months, but it has dramatically worsened over the past week.  She states he has been having ideas of how he can change the world and he has been researching [employer] and has to meet him to start making the world a better place because Marcia will understand him. He has a notebook that he has been writing his ideas in so he does not forget them. She states he's not been sleeping, staying up all night watching documentaries and researching Marcia. 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       His wife and other members of his family were having a meeting that he was unaware of to discuss his change in behavior and figure out what they should do when he called her sobbing and told her he \"didn't know how much longer he could continue to do this\" and that he was paranoid that \"some people\" were going to break in and try and get his ideas, so he decided to hide in the closet. He had a pistol in the closet and reportedly asked if he should load it just in case someone did come to harm him. The family called the police and returned home to find him unarmed, but feel unsafe around him.       According to the patient he was/is not suicidal and had no intention to harm himself or anyone else, he just felt that he might need to load it in case someone did come after his ideas. He reports taunting the police once they were in his house but does not understand why he was handcuffed and brought to [facility]. States he was not a danger to himself or anyone else.      Patient explains about 6 months ago he had a revelation and now feels like a completely different person. He apparently is one of four \"chosen ones\" to change the world; Roula Offer, and himself.  He says he's been doing a lot of research and he's now able to understand very difficult concepts, believes we need to talk to the aliens to work with them to better the human race etc. He called his wife on the phone in front of us and became very explosive during the conversation, raising his voice, pacing, and finally setting the phone down and walking away. He is wanting to go home and does not wish to take any medications. Hospital Course  Patient has partook in groups since admit date. He was very heightened and pressured upon admission, but has gradually improved and been med compliant the last three days. He seems very optimistic and states his marriage is going to be good, he is going to continue working for [employer] for the meantime to continue to make money, but still wants to pursue new business ideas. States work was stressful and unfulfilling. I asked about outpatient Tx/therapy and he says he is open to it and thinks it \"wouldn't be a bd idea\" but wants to talk to his family first. DC planned for today and wife and/or sister coming to pick him up. Patient stabilized on meds and milieu treatment. Patient was discharged to home to continue recovery in the community.    PE: (reviewed) and labs (see medical H&PE)  Labs:    Admission on 07/16/2020   Component Date Value Ref Range Status    WBC 07/16/2020 9.1  4.0 - 11.0 K/uL Final    RBC 07/16/2020 5.56  4.20 - 5.90 M/uL Final    Hemoglobin 07/16/2020 16.2  13.5 - 17.5 g/dL Final    Hematocrit 07/16/2020 46.8  40.5 - 52.5 % Final    MCV 07/16/2020 84.2  80.0 - 100.0 fL Final    MCH 07/16/2020 29.2  26.0 - 34.0 pg Final    MCHC 07/16/2020 34.6  31.0 - 36.0 g/dL Final    RDW 07/16/2020 13.3  12.4 - 15.4 % Final    Platelets 18/46/6003 229  135 - 450 K/uL Final    MPV 07/16/2020 8.6  5.0 - 10.5 fL Final    Neutrophils % 07/16/2020 75.9  % Final    Lymphocytes % 07/16/2020 16.1  % Final    Monocytes % 07/16/2020 7.2  % Final    Eosinophils % 07/16/2020 0.5  % Final    Basophils % 07/16/2020 0.3  % Final    Neutrophils Absolute 07/16/2020 6.9  1.7 - 7.7 K/uL Final    Lymphocytes Absolute 07/16/2020 1.5  1.0 - 5.1 K/uL Final    Monocytes Absolute 07/16/2020 0.7  0.0 - 1.3 K/uL Final    Eosinophils Absolute 07/16/2020 0.0  0.0 - 0.6 K/uL Final    Basophils Absolute 07/16/2020 07/17/2020 POSITIVE* Negative <50 ng/mL Final    Cocaine Metabolite Screen, Urine 07/17/2020 Neg  Negative <300 ng/mL Final    Opiate Scrn, Ur 07/17/2020 Neg  Negative <300 ng/mL Final    Comment: \"Therapeutic levels of pain medication, especially oxycontin and synthetic  opioids, may not be detected by this Methodology. Pain management screen  panel  Drug panel-PM-Hi Res Ur, Interp (PAIN) should be considered for drug  monitoring \".  PCP Screen, Urine 07/17/2020 Neg  Negative <25 ng/mL Final    Methadone Screen, Urine 07/17/2020 Neg  Negative <300 ng/mL Final    Propoxyphene Scrn, Ur 07/17/2020 Neg  Negative <300 ng/mL Final    Oxycodone Urine 07/17/2020 Neg  Negative <100 ng/ml Final    pH, UA 07/17/2020 6.0   Final    Comment: Urine pH less than 5.0 or greater than 8.0 may indicate sample adulteration. Another sample should be collected if clinically  indicated.  Drug Screen Comment: 07/17/2020 see below   Final    Comment: This method is a screening test to detect only these drug  classes as part of a medical workup. Confirmatory testing  by another method should be ordered if clinically indicated.  SARS-CoV-2, NAAT 07/17/2020 Not Detected  Not Detected Final    Comment: Rapid NAAT:   Negative results should be treated as presumptive and,  if inconsistent with clinical signs and symptoms or necessary for  patient management, should be tested with an alternative molecular  assay. Negative results do not preclude SARS-CoV-2 infection and  should not be used as the sole basis for patient management decisions. This test has been authorized by the FDA under an Emergency Use  Authorization (EUA) for use by authorized laboratories.     Fact sheet for Healthcare Providers:  BuildHer.es  Fact sheet for Patients: BuildHer.es    METHODOLOGY: Isothermal Nucleic Acid Amplification      TSH 07/16/2020 1.88  0.27 - 4.20 uIU/mL Final    Hemoglobin A1C 07/18/2020 4.7  See comment % Final    Comment: Comment:  Diagnosis of Diabetes: > or = 6.5%  Increased risk of diabetes (Prediabetes): 5.7-6.4%  Glycemic Control: Nonpregnant Adults: <7.0%                    Pregnant: <6.0%        eAG 07/18/2020 88.2  mg/dL Final    Cholesterol, Total 07/18/2020 146  0 - 199 mg/dL Final    Triglycerides 07/18/2020 111  0 - 150 mg/dL Final    HDL 07/18/2020 39* 40 - 60 mg/dL Final    LDL Calculated 07/18/2020 85  <100 mg/dL Final    VLDL Cholesterol Calculated 07/18/2020 22  Not Established mg/dL Final        Mental Status Exam at Discharge:  Level of consciousness:  awake  Appearance:  well-appearing, in chair, good grooming and good hygiene well-developed, well-nourished  Behavior/Motor:  no abnormalities noted normal gait and station AIMS: 0  Attitude toward examiner:  cooperative, attentive and good eye contact  Speech:  spontaneous, normal rate, normal volume and well articulated  Mood:  euphoric  Affect:  mood congruent Anxiety: mild  Hallucinations: Absent  Thought processes:  coherent Attention span, Concentration & Attention:  attention span and concentration were age appropriate  Thought content:  no evidence of delusions OCD: none    Insight: normal insight and judgment Cognition:  oriented to person, place, and time  Fund of Knowledge: average  IQ:average Memory: intact  Suicide:  No specific plan to harm self  Sleep: sleeps through the night  Appetite: good   Reassess Elma Risk:  no specific plan to harm self Pt has phone numbers to contact if suicidal thoughts recur and states pt will return to the hospital if suicidal feelings return.    Hospital Routine Meds:     OLANZapine  10 mg Oral Nightly    divalproex  500 mg Oral QPM    traZODone  100 mg Oral Nightly    losartan  50 mg Oral Daily      Hospital PRN Meds: nicotine polacrilex, nicotine polacrilex, acetaminophen, ibuprofen, magnesium hydroxide, haloperidol **OR** haloperidol lactate, diphenhydrAMINE **OR** diphenhydrAMINE, LORazepam **OR** LORazepam   Discharge Meds:    Current Discharge Medication List           Details   divalproex (DEPAKOTE ER) 500 MG extended release tablet Take 1 tablet by mouth every evening  Qty: 30 tablet, Refills: 3      traZODone (DESYREL) 100 MG tablet Take 1 tablet by mouth nightly  Qty: 30 tablet, Refills: 0      losartan (COZAAR) 50 MG tablet Take 1 tablet by mouth daily  Qty: 30 tablet, Refills: 3      OLANZapine (ZYPREXA) 10 MG tablet Take 1 tablet by mouth nightly  Qty: 30 tablet, Refills: 3                     Disposition - Residence    Home    Follow Up:  See Discharge Instructions     ASAD Del Toro         Addendum to FRANKO stark note:  Pt seen, examined, and evaluated with FRANKO stark , who acted as my scribe for the above documentation.  I have reviewed the current history, physical findings, labs, assessment and plan; and agree with note as documented.  Merary Sarah MD  Physician Psychiatry

## 2020-07-22 NOTE — GROUP NOTE
Group Therapy Note    Date: 7/22/2020    Group Start Time: 1000  Group End Time: 1100  Group Topic: [facility]        Group Therapy Note    Attendees: 10         Patient's Goal:  Patients were invited to participate in an Art Therapy / Psycho-Education group on relationships. Patients were invited to engage in a discussion on characteristics of healthy vs. unhealthy relationships and to reflect on the relationships in their lives through art making. Patients were asked to depict through art making if their relationships needed to be strengthened, weakened, or removed to help patients practice healthy boundaries. At the end of group, patients were encouraged to share and process their work and to reflect on their relationships. Notes: Jose J Umana appeared attentive and participated actively in group discussion on healthy relationships, engaged in art making, and shared work with the group. Status After Intervention:  Improved    Participation Level:  Active Listener and Interactive    Participation Quality: Appropriate, Attentive, Sharing and Supportive      Speech:  pressured      Thought Process/Content: Linear      Affective Functioning: Congruent      Mood: euthymic      Level of consciousness:  Alert, Oriented x4 and Attentive      Response to Learning: Able to verbalize current knowledge/experience, Able to verbalize/acknowledge new learning, Able to retain information and Capable of insight      Endings: None Reported    Modes of Intervention: Education, Support, Socialization, Exploration, Clarifying, Activity and Media      Discipline Responsible: Psychoeducational Specialist      Signature:  Jessica Abraham, [facility]

## 2020-07-22 NOTE — BH NOTE
[facility]  Discharge Note    Pt discharged with followings belongings:   Dentures: None  Vision - Corrective Lenses: None  Hearing Aid: None  Jewelry: None  Body Piercings Removed: No  Clothing: Footwear, Shirt, Socks, Pants, Undergarments (Comment)  Were All Patient Medications Collected?: Not Applicable  Other Valuables: None   Valuables sent home with pt. Valuables retrieved from safe and returned to patient. Patient education on aftercare instructions: yes. No provider to fax discharge information to. Patient verbalize understanding of AVS:  yes.     Status EXAM upon discharge:  Status and Exam  Normal: No  Facial Expression: Exaggerated  Affect: Congruent  Level of Consciousness: Alert  Mood:Normal: No  Mood: Anxious  Motor Activity:Normal: No  Motor Activity: Decreased  Interview Behavior: Cooperative  Preception: [place] to Person, Svitlana Klinefelter to Time, [place] to Place, [place] to Situation  Attention:Normal: Yes  Attention: Distractible  Thought Processes: Flt.of Ideas  Thought Content:Normal: Yes  Thought Content: Delusions, Paranoia  Hallucinations: None  Delusions: Yes  Delusions: Grandeur  Memory:Normal: No  Memory: Poor Recent, Poor Remote  Insight and Judgment: No  Insight and Judgment: Poor Judgment, Poor Insight  Present Suicidal Ideation: No  Present Homicidal Ideation: No      Metabolic Screening:    Lab Results   Component Value Date    LABA1C 4.7 07/18/2020       Lab Results   Component Value Date    CHOL 146 07/18/2020     Lab Results   Component Value Date    TRIG 111 07/18/2020     Lab Results   Component Value Date    HDL 39 (L) 07/18/2020     No components found for: [facility] [place]  Lab Results   Component Value Date    LABVLDL 22 07/18/2020       Aníbal Schulte RN

## 2020-07-22 NOTE — BH NOTE
Bridge Appointment completed: Reviewed Discharge Instructions with patient. Patient verbalizes understanding and agreement with the discharge plan using the teachback method.      Referral for Outpatient Tobacco Cessation Counseling, upon discharge (merced X if applicable and completed):    ( )  Hospital staff assisted patient to call Quit Line or faxed referral                                   during hospitalization                  ( )  Recognizing danger situations (included triggers and roadblocks), if not completed on admission                    ( )  Coping skills (new ways to manage stress, exercise, relaxation techniques, changing routine, distraction), if not completed on admission                                                           ( )  Basic information about quitting (benefits of quitting, techniques in how to quit, available resources, if not completed on admission  ( ) Referral for counseling faxed to [facility]   (x ) Patient refused referral  (x ) Patient refused counseling  ( ) Patient refused smoking cessation medication upon discharge    Vaccinations (merced X if applicable and completed):  ( ) Patient states already received influenza vaccine elsewhere  ( ) Patient received influenza vaccine during this hospitalization  (x ) Patient refused influenza vaccine at this time

## 2020-11-12 ENCOUNTER — OFFICE VISIT (OUTPATIENT)
Dept: PRIMARY CARE CLINIC | Age: 35
End: 2020-11-12
Payer: COMMERCIAL

## 2020-11-12 VITALS
BODY MASS INDEX: 29.21 KG/M2 | DIASTOLIC BLOOD PRESSURE: 80 MMHG | TEMPERATURE: 97.1 F | HEART RATE: 77 BPM | OXYGEN SATURATION: 98 % | WEIGHT: 220.4 LBS | HEIGHT: 73 IN | SYSTOLIC BLOOD PRESSURE: 130 MMHG

## 2020-11-12 DIAGNOSIS — R61 NIGHT SWEATS: ICD-10-CM

## 2020-11-12 LAB
A/G RATIO: 3.1 (ref 1.1–2.2)
ALBUMIN SERPL-MCNC: 5.2 G/DL (ref 3.4–5)
ALP BLD-CCNC: 74 U/L (ref 40–129)
ALT SERPL-CCNC: 62 U/L (ref 10–40)
ANION GAP SERPL CALCULATED.3IONS-SCNC: 14 MMOL/L (ref 3–16)
AST SERPL-CCNC: 25 U/L (ref 15–37)
BASOPHILS ABSOLUTE: 0 K/UL (ref 0–0.2)
BASOPHILS RELATIVE PERCENT: 0.3 %
BILIRUB SERPL-MCNC: 2.2 MG/DL (ref 0–1)
BUN BLDV-MCNC: 10 MG/DL (ref 7–20)
CALCIUM SERPL-MCNC: 10 MG/DL (ref 8.3–10.6)
CHLORIDE BLD-SCNC: 102 MMOL/L (ref 99–110)
CO2: 25 MMOL/L (ref 21–32)
CREAT SERPL-MCNC: 0.8 MG/DL (ref 0.9–1.3)
EOSINOPHILS ABSOLUTE: 0 K/UL (ref 0–0.6)
EOSINOPHILS RELATIVE PERCENT: 0.4 %
EPITHELIAL CELLS, UA: 0 /HPF (ref 0–5)
GFR AFRICAN AMERICAN: >60
GFR NON-AFRICAN AMERICAN: >60
GLOBULIN: 1.7 G/DL
GLUCOSE BLD-MCNC: 101 MG/DL (ref 70–99)
HCT VFR BLD CALC: 48.6 % (ref 40.5–52.5)
HEMOGLOBIN: 16.9 G/DL (ref 13.5–17.5)
HYALINE CASTS: 0 /LPF (ref 0–8)
LYMPHOCYTES ABSOLUTE: 1.9 K/UL (ref 1–5.1)
LYMPHOCYTES RELATIVE PERCENT: 22.2 %
MCH RBC QN AUTO: 28.3 PG (ref 26–34)
MCHC RBC AUTO-ENTMCNC: 34.8 G/DL (ref 31–36)
MCV RBC AUTO: 81.6 FL (ref 80–100)
MONOCYTES ABSOLUTE: 0.6 K/UL (ref 0–1.3)
MONOCYTES RELATIVE PERCENT: 7 %
NEUTROPHILS ABSOLUTE: 5.9 K/UL (ref 1.7–7.7)
NEUTROPHILS RELATIVE PERCENT: 70.1 %
PDW BLD-RTO: 13 % (ref 12.4–15.4)
PLATELET # BLD: 245 K/UL (ref 135–450)
PMV BLD AUTO: 9.1 FL (ref 5–10.5)
POTASSIUM SERPL-SCNC: 4.4 MMOL/L (ref 3.5–5.1)
RBC # BLD: 5.95 M/UL (ref 4.2–5.9)
RBC UA: 2 /HPF (ref 0–4)
SODIUM BLD-SCNC: 141 MMOL/L (ref 136–145)
TOTAL PROTEIN: 6.9 G/DL (ref 6.4–8.2)
TSH REFLEX FT4: 1.2 UIU/ML (ref 0.27–4.2)
URINE TYPE: NORMAL
WBC # BLD: 8.4 K/UL (ref 4–11)
WBC UA: 0 /HPF (ref 0–5)

## 2020-11-12 PROCEDURE — 99203 OFFICE O/P NEW LOW 30 MIN: CPT | Performed by: STUDENT IN AN ORGANIZED HEALTH CARE EDUCATION/TRAINING PROGRAM

## 2020-11-12 RX ORDER — HYDROXYZINE PAMOATE 25 MG/1
25 CAPSULE ORAL 3 TIMES DAILY PRN
Qty: 90 CAPSULE | Refills: 0 | Status: SHIPPED | OUTPATIENT
Start: 2020-11-12 | End: 2020-11-26

## 2020-11-12 RX ORDER — LURASIDONE HYDROCHLORIDE 60 MG/1
60 TABLET, FILM COATED ORAL DAILY
COMMUNITY
Start: 2020-11-09 | End: 2021-12-06

## 2020-11-12 ASSESSMENT — ENCOUNTER SYMPTOMS
VOMITING: 0
COUGH: 0
CONSTIPATION: 0
DIARRHEA: 0
SORE THROAT: 0
RHINORRHEA: 0
NAUSEA: 0
SHORTNESS OF BREATH: 0

## 2020-11-12 NOTE — PROGRESS NOTES
2020    Ranjan Busch (:  1985) is a 30 y.o. male, here for evaluation of the following medical concerns:    HPI  Dx with BPD in  and went to hospital had to be hospitalized. Gets really depressed and anxious, every morning cannot get out of bed and sweating/ freezing. Was on zyprexa and weight went up 30 pounds, was put on metformin for glucose and then stopped taking medication and got cold sweats. Restarted meds and feels better. This year sales were down and started journaling and coming up with lots of ideas and wasn't sleeping, very first manic episode lasting a week. Here he sees a psychiatrist who prescribes his psychiatric medications met with a psychologist but did not like him. He is having a hard time dealing with his depression states he just does not feel like himself. Over a year ago states he was happy and was very successful and now he does not feel like he recognizes himself. Denies any SI or HI denies any auditory visual hallucinations. Does not have a history of diabetes but does have high blood pressure. States that once he started metformin he did did lose about 20 pounds. No fevers, no other issues. Review of Systems   Constitutional: Negative for chills and fever. HENT: Negative for congestion, rhinorrhea and sore throat. Eyes: Negative for visual disturbance. Respiratory: Negative for cough and shortness of breath. Cardiovascular: Negative for chest pain. Gastrointestinal: Negative for constipation, diarrhea, nausea and vomiting. Endocrine: Negative for polydipsia and polyuria. Genitourinary: Negative for dysuria. Musculoskeletal: Negative for arthralgias. Skin: Negative for rash. Allergic/Immunologic: Negative for environmental allergies. Neurological: Negative for headaches. Psychiatric/Behavioral: The patient is not nervous/anxious. Prior to Visit Medications    Medication Sig Taking?  Authorizing Provider hydrOXYzine (VISTARIL) 25 MG capsule Take 1 capsule by mouth 3 times daily as needed for Anxiety Yes Eulalia Linder MD   traZODone (DESYREL) 100 MG tablet Take 1 tablet by mouth nightly Yes Paschal Cogan, MD   LATUDA 60 MG TABS tablet 60 tablets by Alternating Nares route daily  Historical Provider, MD   metFORMIN (GLUCOPHAGE) 500 MG tablet Take 500 mg by mouth 2 times daily  Historical Provider, MD   divalproex (DEPAKOTE ER) 500 MG extended release tablet Take 1 tablet by mouth every evening  Patient not taking: Reported on 11/12/2020  Paschal Cogan, MD   losartan (COZAAR) 50 MG tablet Take 1 tablet by mouth daily  Patient not taking: Reported on 11/12/2020  Paschal Cogan, MD   OLANZapine (ZYPREXA) 10 MG tablet Take 1 tablet by mouth nightly  Patient not taking: Reported on 11/12/2020  Paschal Cogan, MD        Allergies   Allergen Reactions    Pcn [Penicillins] Other (See Comments)     unknown       Past Medical History:   Diagnosis Date    Anxiety        Past Surgical History:   Procedure Laterality Date    VASECTOMY         Social History     Socioeconomic History    Marital status:      Spouse name: Not on file    Number of children: 3    Years of education: 15    Highest education level: Not on file   Occupational History    Not on file   Social Needs    Financial resource strain: Not on file    Food insecurity     Worry: Not on file     Inability: Not on file   [employer] needs     Medical: Not on file     Non-medical: Not on file   Tobacco Use    Smoking status: Never Smoker    Smokeless tobacco: Current User     Types: Snuff   Substance and Sexual Activity    Alcohol use: Never     Frequency: Never    Drug use: Not Currently     Types: Marijuana    Sexual activity: Yes     Partners: Female   Lifestyle    Physical activity     Days per week: Not on file     Minutes per session: Not on file    Stress: Not on file   Relationships    Social connections     Talks on phone: Not on file     Gets together: Not on file     Attends Confucianist service: Not on file     Active member of club or organization: Not on file     Attends meetings of clubs or organizations: Not on file     Relationship status: Not on file    Intimate partner violence     Fear of current or ex partner: Not on file     Emotionally abused: Not on file     Physically abused: Not on file     Forced sexual activity: Not on file   Other Topics Concern    Not on file   Social History Narrative    Not on file        No family history on file. Vitals:    11/12/20 1031   BP: 130/80   Site: Right Upper Arm   Position: Sitting   Cuff Size: Large Adult   Pulse: 77   Temp: 97.1 °F (36.2 °C)   TempSrc: Infrared   SpO2: 98%   Weight: 220 lb 6.4 oz (100 kg)   Height: 6' 1.2\" (1.859 m)     Estimated body mass index is 28.92 kg/m² as calculated from the following:    Height as of this encounter: 6' 1.2\" (1.859 m). Weight as of this encounter: 220 lb 6.4 oz (100 kg). Physical Exam  Vitals signs reviewed. Constitutional:       General: He is not in acute distress. Appearance: Normal appearance. He is not ill-appearing. HENT:      Head: Normocephalic and atraumatic. Right Ear: Tympanic membrane, ear canal and external ear normal.      Left Ear: Tympanic membrane, ear canal and external ear normal.      Nose: Nose normal. No rhinorrhea. Mouth/Throat:      Mouth: Mucous membranes are moist.      Pharynx: Oropharynx is clear. No oropharyngeal exudate. Eyes:      General: No scleral icterus. Right eye: No discharge. Left eye: No discharge. Extraocular Movements: Extraocular movements intact. Conjunctiva/sclera: Conjunctivae normal.   Neck:      Musculoskeletal: Neck supple. No muscular tenderness. Cardiovascular:      Rate and Rhythm: Normal rate and regular rhythm. Pulses: Normal pulses. Heart sounds: Normal heart sounds. No murmur. No gallop.     Pulmonary: Effort: Pulmonary effort is normal.      Breath sounds: Normal breath sounds. No wheezing, rhonchi or rales. Abdominal:      General: Abdomen is flat. Bowel sounds are normal. There is no distension. Palpations: Abdomen is soft. There is no mass. Musculoskeletal: Normal range of motion. Lymphadenopathy:      Cervical: No cervical adenopathy. Skin:     General: Skin is warm. Capillary Refill: Capillary refill takes less than 2 seconds. Findings: No rash. Neurological:      General: No focal deficit present. Mental Status: He is alert. Cranial Nerves: No cranial nerve deficit. Psychiatric:         Mood and Affect: Mood normal.         Behavior: Behavior normal.         ASSESSMENT/PLAN:    29-year-old with recent diagnosis of bipolar disorder and first manic episode requiring hospitalization. He is already seeing a psychiatrist right now did recommend that he speak with therapist for coping mechanisms etc.  He would like to establish with Dr. Shaye Basilio at least for the short short-term and get any referrals if needed. He does seem to have issues with anxiety having episodes of sweating and drenching night sweats. Does not have any other alarm symptoms. Can try Vistaril for anxiety as well as following up with psychology. As her night sweats will run some more labs. Would like him to follow-up in 1 month. 1. Bipolar 1 disorder ([facility] [address]  -Continue with Latuda  -Continue with psychiatrist  - Ambulatory referral to Psychology  -Patient can continue with metformin to help with weight gain associated with Latuda    2. Night sweats  - TSH WITH REFLEX TO FT4; Future  - COMPREHENSIVE METABOLIC PANEL; Future  - MICROSCOPIC URINALYSIS; Future  - HIV-1 AND HIV-2 ANTIBODIES; Future  - CBC WITH AUTO DIFFERENTIAL; Future      Return in about 4 weeks (around 12/10/2020) for Bipolar disorder and night sweats. An  electronic signature was used to authenticate this note.     --Eulalia Linder MD on 11/12/2020 at 2:06 PM

## 2020-11-13 DIAGNOSIS — R17 SERUM TOTAL BILIRUBIN ELEVATED: ICD-10-CM

## 2020-11-13 LAB
BILIRUBIN DIRECT: 0.3 MG/DL (ref 0–0.3)
BILIRUBIN, INDIRECT: 1.9 MG/DL (ref 0–1)
HIV AG/AB: NORMAL
HIV ANTIGEN: NORMAL
HIV-1 ANTIBODY: NORMAL
HIV-2 AB: NORMAL

## 2020-11-19 NOTE — TELEPHONE ENCOUNTER
Pt is requesting a new script for losartan (COZAAR) 50 MG tablet   This was originally prescribe by patient historic physician   HEART [facility] [address] [facility], [facility]  [facility] [address]

## 2020-11-20 RX ORDER — LOSARTAN POTASSIUM 50 MG/1
50 TABLET ORAL DAILY
Qty: 30 TABLET | Refills: 0 | Status: SHIPPED | OUTPATIENT
Start: 2020-11-20 | End: 2020-12-22

## 2020-11-20 NOTE — TELEPHONE ENCOUNTER
[phone] (home)   LM for pts wife, no information in chart regarding refilling Losartan, advised message will be routed to Dr Jean Gonzalez.     Spoke with Dr Jean Gonzalez ok to give Losartan 50mg #30 x 0

## 2020-11-20 NOTE — TELEPHONE ENCOUNTER
Pt's wife is calling again to get this medication refilled. Please let her know if there is an issue.

## 2020-12-21 NOTE — TELEPHONE ENCOUNTER
Medication:   Requested Prescriptions     Pending Prescriptions Disp Refills    losartan (COZAAR) 50 MG tablet [Pharmacy Med Name: LOSARTAN POTASSIUM 50 MG TAB] 30 tablet 0     Sig: TAKE ONE TABLET BY MOUTH DAILY     Last Filled:  11/20/20    Last appt: 11/12/2020   Next appt: Visit date not found    Last OARRS: No flowsheet data found.

## 2020-12-22 RX ORDER — LOSARTAN POTASSIUM 50 MG/1
TABLET ORAL
Qty: 30 TABLET | Refills: 0 | Status: SHIPPED | OUTPATIENT
Start: 2020-12-22 | End: 2020-12-23 | Stop reason: SDUPTHER

## 2020-12-23 ENCOUNTER — TELEPHONE (OUTPATIENT)
Dept: PRIMARY CARE CLINIC | Age: 35
End: 2020-12-23

## 2020-12-23 RX ORDER — LOSARTAN POTASSIUM 50 MG/1
50 TABLET ORAL DAILY
Qty: 90 TABLET | Refills: 2 | Status: SHIPPED | OUTPATIENT
Start: 2020-12-23 | End: 2021-09-09 | Stop reason: SDUPTHER

## 2021-03-04 DIAGNOSIS — R74.01 ELEVATED ALT MEASUREMENT: ICD-10-CM

## 2021-03-05 LAB
HBV SURFACE AB TITR SER: <3.5 MIU/ML
HEPATITIS B SURFACE ANTIGEN INTERPRETATION: NORMAL
HEPATITIS C ANTIBODY INTERPRETATION: NORMAL

## 2021-03-18 DIAGNOSIS — R74.01 ELEVATED ALT MEASUREMENT: Primary | ICD-10-CM

## 2021-09-09 DIAGNOSIS — E80.6 HYPERBILIRUBINEMIA: ICD-10-CM

## 2021-09-09 DIAGNOSIS — I10 BENIGN ESSENTIAL HTN: ICD-10-CM

## 2021-09-09 DIAGNOSIS — F31.2 BIPOLAR AFFECTIVE DISORDER, CURRENT EPISODE MANIC WITH PSYCHOTIC SYMPTOMS (HCC): Primary | ICD-10-CM

## 2021-09-09 RX ORDER — LOSARTAN POTASSIUM 50 MG/1
50 TABLET ORAL DAILY
Qty: 90 TABLET | Refills: 0 | Status: SHIPPED | OUTPATIENT
Start: 2021-09-09 | End: 2021-12-06 | Stop reason: SDUPTHER

## 2021-09-09 NOTE — TELEPHONE ENCOUNTER
Patient requesting a medication refill. Medication Losartan  Dosage 50 mg  Frequencytake 1 tablet by mouth  [facility]   Next office visit9/13/21     LOV 11/12/20    Pt completely out of medication.

## 2021-09-09 NOTE — TELEPHONE ENCOUNTER
Patient is due for an office visit and labs. I will refill at this 1 time he'll need to come in to make sure his kidney function and electrolytes are all okay, check on blood pressure etc.  Please make office visit and if possible, have patient come in at least 3 days before his appointment to have labs drawn.

## 2021-12-06 ENCOUNTER — OFFICE VISIT (OUTPATIENT)
Dept: PRIMARY CARE CLINIC | Age: 36
End: 2021-12-06

## 2021-12-06 VITALS
BODY MASS INDEX: 32.28 KG/M2 | DIASTOLIC BLOOD PRESSURE: 84 MMHG | HEART RATE: 54 BPM | WEIGHT: 246 LBS | SYSTOLIC BLOOD PRESSURE: 132 MMHG | OXYGEN SATURATION: 96 %

## 2021-12-06 DIAGNOSIS — F33.0 MILD EPISODE OF RECURRENT MAJOR DEPRESSIVE DISORDER (HCC): ICD-10-CM

## 2021-12-06 DIAGNOSIS — Z00.00 ENCOUNTER FOR ANNUAL PHYSICAL EXAM: Primary | ICD-10-CM

## 2021-12-06 DIAGNOSIS — Z79.899 LITHIUM USE: ICD-10-CM

## 2021-12-06 LAB
BASOPHILS ABSOLUTE: 0 K/UL (ref 0–0.2)
BASOPHILS RELATIVE PERCENT: 0.4 %
EOSINOPHILS ABSOLUTE: 0.1 K/UL (ref 0–0.6)
EOSINOPHILS RELATIVE PERCENT: 1.2 %
EPITHELIAL CELLS, UA: 1 /HPF (ref 0–5)
HCT VFR BLD CALC: 51.4 % (ref 40.5–52.5)
HEMOGLOBIN: 17.1 G/DL (ref 13.5–17.5)
HYALINE CASTS: 3 /LPF (ref 0–8)
LYMPHOCYTES ABSOLUTE: 1.9 K/UL (ref 1–5.1)
LYMPHOCYTES RELATIVE PERCENT: 21.7 %
MCH RBC QN AUTO: 28.1 PG (ref 26–34)
MCHC RBC AUTO-ENTMCNC: 33.3 G/DL (ref 31–36)
MCV RBC AUTO: 84.4 FL (ref 80–100)
MONOCYTES ABSOLUTE: 0.5 K/UL (ref 0–1.3)
MONOCYTES RELATIVE PERCENT: 6 %
NEUTROPHILS ABSOLUTE: 6.1 K/UL (ref 1.7–7.7)
NEUTROPHILS RELATIVE PERCENT: 70.7 %
PDW BLD-RTO: 12.8 % (ref 12.4–15.4)
PLATELET # BLD: 284 K/UL (ref 135–450)
PMV BLD AUTO: 8.5 FL (ref 5–10.5)
RBC # BLD: 6.09 M/UL (ref 4.2–5.9)
RBC UA: 2 /HPF (ref 0–4)
URINE TYPE: NORMAL
WBC # BLD: 8.7 K/UL (ref 4–11)
WBC UA: 2 /HPF (ref 0–5)

## 2021-12-06 PROCEDURE — 99395 PREV VISIT EST AGE 18-39: CPT | Performed by: STUDENT IN AN ORGANIZED HEALTH CARE EDUCATION/TRAINING PROGRAM

## 2021-12-06 RX ORDER — FLUOXETINE HYDROCHLORIDE 40 MG/1
40 CAPSULE ORAL
COMMUNITY
Start: 2021-11-09

## 2021-12-06 RX ORDER — OLANZAPINE 10 MG/1
10 TABLET ORAL NIGHTLY
COMMUNITY

## 2021-12-06 RX ORDER — LOSARTAN POTASSIUM 50 MG/1
50 TABLET ORAL DAILY
Qty: 90 TABLET | Refills: 1 | Status: SHIPPED | OUTPATIENT
Start: 2021-12-06 | End: 2022-06-23

## 2021-12-06 RX ORDER — LITHIUM CARBONATE 300 MG/1
300 CAPSULE ORAL 2 TIMES DAILY
COMMUNITY
Start: 2021-11-22

## 2021-12-06 SDOH — ECONOMIC STABILITY: FOOD INSECURITY: WITHIN THE PAST 12 MONTHS, YOU WORRIED THAT YOUR FOOD WOULD RUN OUT BEFORE YOU GOT MONEY TO BUY MORE.: NEVER TRUE

## 2021-12-06 SDOH — ECONOMIC STABILITY: FOOD INSECURITY: WITHIN THE PAST 12 MONTHS, THE FOOD YOU BOUGHT JUST DIDN'T LAST AND YOU DIDN'T HAVE MONEY TO GET MORE.: NEVER TRUE

## 2021-12-06 ASSESSMENT — ENCOUNTER SYMPTOMS
CONSTIPATION: 0
DIARRHEA: 0
COUGH: 0
NAUSEA: 0
SHORTNESS OF BREATH: 0
SORE THROAT: 0
RHINORRHEA: 0
VOMITING: 0

## 2021-12-06 ASSESSMENT — SOCIAL DETERMINANTS OF HEALTH (SDOH): HOW HARD IS IT FOR YOU TO PAY FOR THE VERY BASICS LIKE FOOD, HOUSING, MEDICAL CARE, AND HEATING?: NOT HARD AT ALL

## 2021-12-06 NOTE — PROGRESS NOTES
2021    Yesenia Marshall (:  1985) is a 29 y.o. male, here for a preventive medicine evaluation. Patient Active Problem List   Diagnosis    Psychosis ([facility] [address]    Bipolar affective disorder, current episode manic with psychotic symptoms ([facility] [address]    Cannabis abuse    Elevated ALT measurement     Depression  Better than before but cant get out of bed and lack of motivation and has appt with psych on the  has been seeing psych for 2 years now. Intrusive thoughts not there anymore. No illicit drugs  Rare alcohol    [email or web address]      Review of Systems   Constitutional: Negative for chills and fever. HENT: Negative for congestion, rhinorrhea and sore throat. Eyes: Negative for visual disturbance. Respiratory: Negative for cough and shortness of breath. Cardiovascular: Negative for chest pain. Gastrointestinal: Negative for constipation, diarrhea, nausea and vomiting. Endocrine: Negative for polydipsia and polyuria. Genitourinary: Negative for dysuria. Musculoskeletal: Negative for arthralgias. Skin: Negative for rash. Allergic/Immunologic: Negative for environmental allergies. Neurological: Negative for headaches. Psychiatric/Behavioral: The patient is not nervous/anxious. Prior to Visit Medications    Medication Sig Taking?  Authorizing Provider   lithium 300 MG capsule Take 300 mg by mouth 2 times daily Yes Historical Provider, MD   FLUoxetine (PROZAC) 40 MG capsule 40 mg Yes Historical Provider, MD   OLANZapine (ZYPREXA) 10 MG tablet Take 10 mg by mouth nightly Yes Historical Provider, MD   losartan (COZAAR) 50 MG tablet Take 1 tablet by mouth daily Yes Rosalie Cantu MD        Allergies   Allergen Reactions    Pcn [Penicillins] Other (See Comments)     unknown       Past Medical History:   Diagnosis Date    Anxiety        Past Surgical History:   Procedure Laterality Date    VASECTOMY         Social History     Socioeconomic History    Marital ADVANCE DIRECTIVE: N, <no information>    Vitals:    12/06/21 1258   BP: 132/84   Site: Right Upper Arm   Position: Sitting   Cuff Size: Large Adult   Pulse: 54   SpO2: 96%   Weight: 246 lb (111.6 kg)     Estimated body mass index is 32.28 kg/m² as calculated from the following:    Height as of 11/12/20: 6' 1.2\" (1.859 m). Weight as of this encounter: 246 lb (111.6 kg). Physical Exam  Vitals reviewed. Constitutional:       General: He is not in acute distress. Appearance: Normal appearance. He is not ill-appearing. HENT:      Head: Normocephalic and atraumatic. Right Ear: Tympanic membrane, ear canal and external ear normal.      Left Ear: Tympanic membrane, ear canal and external ear normal.      Nose: Nose normal. No rhinorrhea. Mouth/Throat:      Mouth: Mucous membranes are moist.      Pharynx: Oropharynx is clear. No oropharyngeal exudate. Eyes:      General: No scleral icterus. Right eye: No discharge. Left eye: No discharge. Extraocular Movements: Extraocular movements intact. Conjunctiva/sclera: Conjunctivae normal.   Cardiovascular:      Rate and Rhythm: Normal rate and regular rhythm. Pulses: Normal pulses. Heart sounds: Normal heart sounds. No murmur heard. No gallop. Pulmonary:      Effort: Pulmonary effort is normal.      Breath sounds: Normal breath sounds. No wheezing, rhonchi or rales. Abdominal:      General: Abdomen is flat. Bowel sounds are normal. There is no distension. Palpations: Abdomen is soft. There is no mass. Musculoskeletal:         General: Normal range of motion. Cervical back: Neck supple. No muscular tenderness. Lymphadenopathy:      Cervical: No cervical adenopathy. Skin:     General: Skin is warm. Capillary Refill: Capillary refill takes less than 2 seconds. Findings: No rash. Neurological:      General: No focal deficit present. Mental Status: He is alert.       Cranial Nerves: No cranial nerve deficit. Psychiatric:         Mood and Affect: Mood normal.         Behavior: Behavior normal.         No flowsheet data found. Lab Results   Component Value Date    CHOL 146 07/18/2020    TRIG 111 07/18/2020    HDL 39 07/18/2020    LDLCALC 85 07/18/2020    GLUCOSE 101 11/12/2020    LABA1C 4.7 07/18/2020       The ASCVD Risk score (Tamanna Deal, et al., 2013) failed to calculate for the following reasons: The 2013 ASCVD risk score is only valid for ages 36 to 78      There is no immunization history on file for this patient. Health Maintenance   Topic Date Due    Varicella vaccine (1 of 2 - 2-dose childhood series) Never done    COVID-19 Vaccine (1) Never done    Flu vaccine (1) 09/01/2021    Potassium monitoring  11/12/2021    Creatinine monitoring  11/12/2021    DTaP/Tdap/Td vaccine (7 - Td or Tdap) 02/26/2025    Hepatitis C screen  Completed    HIV screen  Completed    Hepatitis A vaccine  Aged Out    Hepatitis B vaccine  Aged Out    Hib vaccine  Aged Out    Meningococcal (ACWY) vaccine  Aged Out    Pneumococcal 0-64 years Vaccine  Aged Out          ASSESSMENT/PLAN:  1. Encounter for annual physical exam  2. Mild episode of recurrent major depressive disorder ([facility] [address]  -     Lipid Panel; Future  -     CBC WITH AUTO DIFFERENTIAL; Future  -     TSH without Reflex; Future  -     T4, FREE; Future  -     HEMOGLOBIN A1C; Future  -     MICROSCOPIC URINALYSIS; Future  -     COMPREHENSIVE METABOLIC PANEL; Future  3. Lithium use  -     LITHIUM LEVEL; Future      No follow-ups on file. An electronic signature was used to authenticate this note.     --Bailey Alonzo MD on 12/6/2021 at 3:51 PM

## 2021-12-07 LAB
A/G RATIO: 2.6 (ref 1.1–2.2)
ALBUMIN SERPL-MCNC: 5.4 G/DL (ref 3.4–5)
ALP BLD-CCNC: 81 U/L (ref 40–129)
ALT SERPL-CCNC: 25 U/L (ref 10–40)
ANION GAP SERPL CALCULATED.3IONS-SCNC: 18 MMOL/L (ref 3–16)
AST SERPL-CCNC: 19 U/L (ref 15–37)
BILIRUB SERPL-MCNC: 1.4 MG/DL (ref 0–1)
BUN BLDV-MCNC: 11 MG/DL (ref 7–20)
CALCIUM SERPL-MCNC: 10.2 MG/DL (ref 8.3–10.6)
CHLORIDE BLD-SCNC: 99 MMOL/L (ref 99–110)
CHOLESTEROL, TOTAL: 225 MG/DL (ref 0–199)
CO2: 24 MMOL/L (ref 21–32)
CREAT SERPL-MCNC: 1 MG/DL (ref 0.9–1.3)
ESTIMATED AVERAGE GLUCOSE: 91.1 MG/DL
GFR AFRICAN AMERICAN: >60
GFR NON-AFRICAN AMERICAN: >60
GLUCOSE BLD-MCNC: 92 MG/DL (ref 70–99)
HBA1C MFR BLD: 4.8 %
HDLC SERPL-MCNC: 42 MG/DL (ref 40–60)
LDL CHOLESTEROL CALCULATED: 145 MG/DL
LITHIUM DOSE AMOUNT: NORMAL
LITHIUM LEVEL: 0.9 MMOL/L (ref 0.6–1.2)
POTASSIUM SERPL-SCNC: 4.1 MMOL/L (ref 3.5–5.1)
SODIUM BLD-SCNC: 141 MMOL/L (ref 136–145)
T4 FREE: 1.6 NG/DL (ref 0.9–1.8)
TOTAL PROTEIN: 7.5 G/DL (ref 6.4–8.2)
TRIGL SERPL-MCNC: 191 MG/DL (ref 0–150)
TSH SERPL DL<=0.05 MIU/L-ACNC: 2 UIU/ML (ref 0.27–4.2)
VLDLC SERPL CALC-MCNC: 38 MG/DL

## 2022-06-23 RX ORDER — LOSARTAN POTASSIUM 50 MG/1
TABLET ORAL
Qty: 90 TABLET | Refills: 1 | Status: SHIPPED | OUTPATIENT
Start: 2022-06-23

## 2022-06-23 NOTE — TELEPHONE ENCOUNTER
Medication:   Requested Prescriptions     Pending Prescriptions Disp Refills    losartan (COZAAR) 50 MG tablet [Pharmacy Med Name: LOSARTAN POTASSIUM 50 MG TAB] 90 tablet 1     Sig: TAKE ONE TABLET BY MOUTH DAILY     Last Filled: 12.6.21    Last appt: 12/6/2021   Next appt: Visit date not found    Last OARRS: No flowsheet data found.

## 2023-02-01 RX ORDER — LOSARTAN POTASSIUM 50 MG/1
TABLET ORAL
Qty: 90 TABLET | Refills: 1 | OUTPATIENT
Start: 2023-02-01

## 2023-02-01 NOTE — TELEPHONE ENCOUNTER
[phone]  William Franco  Phone: [phone] Fax: [phone]:   Requested Prescriptions     Pending Prescriptions Disp Refills    losartan (COZAAR) 50 MG tablet [Pharmacy Med Name: Danyelle Yo POTASSIUM 50 MG TAB] 90 tablet 1     Sig: TAKE ONE TABLET BY MOUTH DAILY       Last Filled:      Patient Phone Number: [phone] (home)     Last appt: 12/6/2021   Next appt: Visit date not found    Last BMP:   Lab Results   Component Value Date/Time     12/06/2021 01:50 PM    K 4.1 12/06/2021 01:50 PM    CL 99 12/06/2021 01:50 PM    CO2 24 12/06/2021 01:50 PM    ANIONGAP 18 12/06/2021 01:50 PM    GLUCOSE 92 12/06/2021 01:50 PM    BUN 11 12/06/2021 01:50 PM    CREATININE 1.0 12/06/2021 01:50 PM    LABGLOM >60 12/06/2021 01:50 PM    GFRAA >60 12/06/2021 01:50 PM    CALCIUM 10.2 12/06/2021 01:50 PM      Last CMP:   Lab Results   Component Value Date/Time     12/06/2021 01:50 PM    K 4.1 12/06/2021 01:50 PM    CL 99 12/06/2021 01:50 PM    CO2 24 12/06/2021 01:50 PM    ANIONGAP 18 12/06/2021 01:50 PM    GLUCOSE 92 12/06/2021 01:50 PM    BUN 11 12/06/2021 01:50 PM    CREATININE 1.0 12/06/2021 01:50 PM    LABGLOM >60 12/06/2021 01:50 PM    GFRAA >60 12/06/2021 01:50 PM    PROT 7.5 12/06/2021 01:50 PM    LABALBU 5.4 12/06/2021 01:50 PM    AGRATIO 2.6 12/06/2021 01:50 PM    BILITOT 1.4 12/06/2021 01:50 PM    ALKPHOS 81 12/06/2021 01:50 PM    ALT 25 12/06/2021 01:50 PM    AST 19 12/06/2021 01:50 PM    GLOB 1.7 11/12/2020 11:35 AM     Last Renal Function:   Lab Results   Component Value Date/Time     12/06/2021 01:50 PM    K 4.1 12/06/2021 01:50 PM    CL 99 12/06/2021 01:50 PM    CO2 24 12/06/2021 01:50 PM    GLUCOSE 92 12/06/2021 01:50 PM    BUN 11 12/06/2021 01:50 PM    CREATININE 1.0 12/06/2021 01:50 PM    LABALBU 5.4 12/06/2021 01:50 PM    CALCIUM 10.2 12/06/2021 01:50 PM    GFRAA >60 12/06/2021 01:50 PM       Last OARRS: No flowsheet data found.     Preferred Pharmacy:   [facility]

## 2023-03-16 RX ORDER — LOSARTAN POTASSIUM 50 MG/1
TABLET ORAL
Qty: 90 TABLET | Refills: 0 | Status: SHIPPED | OUTPATIENT
Start: 2023-03-16

## 2023-09-14 RX ORDER — LOSARTAN POTASSIUM 50 MG/1
50 TABLET ORAL DAILY
Qty: 90 TABLET | Refills: 0 | OUTPATIENT
Start: 2023-09-14

## 2023-09-14 NOTE — TELEPHONE ENCOUNTER
Medication:   Requested Prescriptions     Pending Prescriptions Disp Refills    losartan (COZAAR) 50 MG tablet 90 tablet 0     Sig: Take 1 tablet by mouth daily     Last Filled:  3/16/23    Last appt: 12/6/2021   Next appt: Visit date not found- Left  Vm To  call to schedule     Last Labs DM:   Lab Results   Component Value Date/Time    LABA1C 4.8 12/06/2021 01:50 PM     Last Lipid:   Lab Results   Component Value Date/Time    CHOL 225 12/06/2021 01:50 PM    TRIG 191 12/06/2021 01:50 PM    HDL 42 12/06/2021 01:50 PM    [facility] 145 12/06/2021 01:50 PM     Last PSA: No results found for: \"PSA\"  Last Thyroid:   Lab Results   Component Value Date/Time    TSH 2.00 12/06/2021 01:50 PM    T4FREE 1.6 12/06/2021 01:50 PM

## 2023-09-15 NOTE — TELEPHONE ENCOUNTER
----- Message from Lara Hernandez sent at 9/15/2023  1:15 PM EDT -----  Subject: Refill Request    QUESTIONS  Name of Medication? losartan (COZAAR) 50 MG tablet  Patient-reported dosage and instructions? 50 MG Once daily   How many days do you have left? 0  Preferred Pharmacy? [facility] [id number]  Pharmacy phone number (if available)? [phone]  Additional Information for Provider? Patient has been out of medication   for 2 days would like a refill to last until this appt Monday  ---------------------------------------------------------------------------  --------------  [address]  What is the best way for the office to contact you? OK to leave message on   voicemail  Preferred Call Back Phone Number? [phone]  ---------------------------------------------------------------------------  --------------  SCRIPT ANSWERS  Relationship to Patient?  Self

## 2023-09-18 ENCOUNTER — TELEMEDICINE (OUTPATIENT)
Dept: PRIMARY CARE CLINIC | Age: 38
End: 2023-09-18
Payer: COMMERCIAL

## 2023-09-18 DIAGNOSIS — E78.2 MIXED HYPERLIPIDEMIA: ICD-10-CM

## 2023-09-18 DIAGNOSIS — I10 BENIGN ESSENTIAL HTN: Primary | ICD-10-CM

## 2023-09-18 DIAGNOSIS — F31.2 BIPOLAR AFFECTIVE DISORDER, CURRENT EPISODE MANIC WITH PSYCHOTIC SYMPTOMS (HCC): ICD-10-CM

## 2023-09-18 PROCEDURE — 99214 OFFICE O/P EST MOD 30 MIN: CPT | Performed by: STUDENT IN AN ORGANIZED HEALTH CARE EDUCATION/TRAINING PROGRAM

## 2023-09-18 RX ORDER — LOSARTAN POTASSIUM 50 MG/1
50 TABLET ORAL DAILY
Qty: 90 TABLET | Refills: 0 | OUTPATIENT
Start: 2023-09-18

## 2023-09-18 RX ORDER — LOSARTAN POTASSIUM 50 MG/1
50 TABLET ORAL DAILY
Qty: 90 TABLET | Refills: 1 | Status: SHIPPED | OUTPATIENT
Start: 2023-09-18

## 2023-09-18 SDOH — ECONOMIC STABILITY: FOOD INSECURITY: WITHIN THE PAST 12 MONTHS, THE FOOD YOU BOUGHT JUST DIDN'T LAST AND YOU DIDN'T HAVE MONEY TO GET MORE.: NEVER TRUE

## 2023-09-18 SDOH — ECONOMIC STABILITY: HOUSING INSECURITY
IN THE LAST 12 MONTHS, WAS THERE A TIME WHEN YOU DID NOT HAVE A STEADY PLACE TO SLEEP OR SLEPT IN A SHELTER (INCLUDING NOW)?: NO

## 2023-09-18 SDOH — ECONOMIC STABILITY: INCOME INSECURITY: HOW HARD IS IT FOR YOU TO PAY FOR THE VERY BASICS LIKE FOOD, HOUSING, MEDICAL CARE, AND HEATING?: SOMEWHAT HARD

## 2023-09-18 SDOH — ECONOMIC STABILITY: FOOD INSECURITY: WITHIN THE PAST 12 MONTHS, YOU WORRIED THAT YOUR FOOD WOULD RUN OUT BEFORE YOU GOT MONEY TO BUY MORE.: NEVER TRUE

## 2023-09-18 SDOH — ECONOMIC STABILITY: TRANSPORTATION INSECURITY
IN THE PAST 12 MONTHS, HAS LACK OF TRANSPORTATION KEPT YOU FROM MEETINGS, WORK, OR FROM GETTING THINGS NEEDED FOR DAILY LIVING?: NO

## 2023-09-18 NOTE — PROGRESS NOTES
reviewed. Constitutional:       General: He is not in acute distress. Appearance: Normal appearance. He is not ill-appearing. HENT:      Head: Normocephalic and atraumatic. Right Ear: External ear normal.      Left Ear: External ear normal.   Eyes:      General: No scleral icterus. Right eye: No discharge. Left eye: No discharge. Extraocular Movements: Extraocular movements intact. Conjunctiva/sclera: Conjunctivae normal.   Pulmonary:      Effort: Pulmonary effort is normal. No respiratory distress. Musculoskeletal:      Cervical back: Neck supple. Skin:     Coloration: Skin is not jaundiced. Findings: No lesion or rash. Neurological:      Mental Status: He is alert. Cranial Nerves: No cranial nerve deficit.       Coordination: Coordination normal.   Psychiatric:         Mood and Affect: Mood normal.                  --Veena Diego MD

## 2023-11-27 NOTE — TELEPHONE ENCOUNTER
Medication:   Requested Prescriptions     Pending Prescriptions Disp Refills    losartan (COZAAR) 50 MG tablet 90 tablet 2     Sig: Take 1 tablet by mouth daily     Last Filled:  12.23.20  Last appt: 11/12/2020   Next appt: 9/13/2021    Last OARRS: No flowsheet data found. Please work on getting medical insurance and follow up with neurology as soon as possible.

## 2024-04-09 DIAGNOSIS — I10 BENIGN ESSENTIAL HTN: ICD-10-CM

## 2024-04-09 RX ORDER — LOSARTAN POTASSIUM 50 MG/1
50 TABLET ORAL DAILY
Qty: 90 TABLET | Refills: 1 | Status: SHIPPED | OUTPATIENT
Start: 2024-04-09

## 2024-04-09 NOTE — TELEPHONE ENCOUNTER
Medication:   Requested Prescriptions     Pending Prescriptions Disp Refills    losartan (COZAAR) 50 MG tablet [Pharmacy Med Name: LOSARTAN POTASSIUM 50 MG TAB] 90 tablet 1     Sig: TAKE 1 TABLET BY MOUTH DAILY     Last Filled:  9/18/2023    Last appt: 9/18/2023   Next appt: Visit date not found    Last OARRS:        No data to display

## 2025-01-17 DIAGNOSIS — I10 BENIGN ESSENTIAL HTN: ICD-10-CM

## 2025-01-17 RX ORDER — LOSARTAN POTASSIUM 50 MG/1
50 TABLET ORAL DAILY
Qty: 30 TABLET | Refills: 0 | OUTPATIENT
Start: 2025-01-17

## 2025-01-17 NOTE — TELEPHONE ENCOUNTER
Medication:   Requested Prescriptions     Pending Prescriptions Disp Refills    losartan (COZAAR) 50 MG tablet [Pharmacy Med Name: LOSARTAN POTASSIUM 50 MG TAB] 30 tablet 0     Sig: TAKE 1 TABLET BY MOUTH DAILY     Last Filled:  4/9/2024    Last appt: 9/18/2023   Next appt: Sent MCM to schedule appt     Last OARRS:        No data to display

## 2025-02-14 DIAGNOSIS — I10 BENIGN ESSENTIAL HTN: ICD-10-CM

## 2025-02-14 RX ORDER — LOSARTAN POTASSIUM 50 MG/1
50 TABLET ORAL DAILY
Qty: 30 TABLET | Refills: 0 | OUTPATIENT
Start: 2025-02-14

## 2025-02-14 NOTE — TELEPHONE ENCOUNTER
Medication:   Requested Prescriptions     Pending Prescriptions Disp Refills    losartan (COZAAR) 50 MG tablet [Pharmacy Med Name: LOSARTAN POTASSIUM 50 MG TAB] 30 tablet 0     Sig: TAKE 1 TABLET BY MOUTH DAILY     Last Filled:  4/9/2024    Last appt: 9/18/2023   Next appt: Sent mcm, pt needs appt     Last OARRS:        No data to display

## 2025-02-18 DIAGNOSIS — I10 BENIGN ESSENTIAL HTN: ICD-10-CM

## 2025-02-18 RX ORDER — LOSARTAN POTASSIUM 50 MG/1
50 TABLET ORAL DAILY
Qty: 30 TABLET | Refills: 0 | Status: SHIPPED | OUTPATIENT
Start: 2025-02-18

## 2025-02-18 NOTE — TELEPHONE ENCOUNTER
Medication:   Requested Prescriptions     Pending Prescriptions Disp Refills    losartan (COZAAR) 50 MG tablet 30 tablet 0     Sig: Take 1 tablet by mouth daily     Last Filled:  4/9/2024    Patient is completely out of medication, scheduled appt for Tuesday, requesting refill to hold over until then     Last appt: 9/18/2023   Next appt: 2/25/2025    Last OARRS:        No data to display

## 2025-02-25 ENCOUNTER — TELEMEDICINE (OUTPATIENT)
Dept: PRIMARY CARE CLINIC | Age: 40
End: 2025-02-25
Payer: COMMERCIAL

## 2025-02-25 DIAGNOSIS — F31.2 BIPOLAR AFFECTIVE DISORDER, CURRENT EPISODE MANIC WITH PSYCHOTIC SYMPTOMS (HCC): ICD-10-CM

## 2025-02-25 DIAGNOSIS — I10 BENIGN ESSENTIAL HTN: ICD-10-CM

## 2025-02-25 DIAGNOSIS — R74.01 ELEVATED ALT MEASUREMENT: ICD-10-CM

## 2025-02-25 DIAGNOSIS — Z00.00 ENCOUNTER FOR ANNUAL PHYSICAL EXAM: Primary | ICD-10-CM

## 2025-02-25 PROCEDURE — 99214 OFFICE O/P EST MOD 30 MIN: CPT | Performed by: STUDENT IN AN ORGANIZED HEALTH CARE EDUCATION/TRAINING PROGRAM

## 2025-02-25 RX ORDER — LOSARTAN POTASSIUM 50 MG/1
50 TABLET ORAL DAILY
Qty: 30 TABLET | Refills: 0 | Status: SHIPPED | OUTPATIENT
Start: 2025-02-25

## 2025-02-25 NOTE — PROGRESS NOTES
Hammad Hercules, was evaluated through a synchronous (real-time) audio-video encounter. The patient (or guardian if applicable) is aware that this is a billable service, which includes applicable co-pays. This Virtual Visit was conducted with patient's (and/or legal guardian's) consent. Patient identification was verified, and a caregiver was present when appropriate.   The patient was located at Home: [address] [place], [place] [address]  Provider was located at Facility (Appt Dept): [address] [place], [place] [address]  Confirm you are appropriately licensed, registered, or certified to deliver care in the state where the patient is located as indicated above. If you are not or unsure, please re-schedule the visit: Yes, I confirm.     Hammad Hercules (:  1985) is a Established patient, presenting virtually for evaluation of the following:      Below is the assessment and plan developed based on review of pertinent history, physical exam, labs, studies, and medications.     Assessment & Plan  Benign essential HTN     Chronic unclear control  Will need to see him in office for full physical  He will need to get labs drawn  I will send in a 30-day prescription of his losartan but will not be able to send in any more until I see him in person and he has blood work, patient aware    Patient is unable to take off work to come in for visit so he will have to be seen at 8 AM I spoke with patient and told him I can fit him in anytime he is available, he will get blood work this Saturday a list of labs open on Saturday was sent through [employer], he will fast for these labs and I will see him for an in person visit soon after  Orders:    losartan (COZAAR) 50 MG tablet; Take 1 tablet by mouth daily    Bipolar affective disorder, current episode manic with psychotic symptoms (HCC)     Per psychiatry         Return for Within the month for an in person visit.       Subjective   HPI    Patient denies any

## 2025-04-23 DIAGNOSIS — F31.2 BIPOLAR AFFECTIVE DISORDER, CURRENT EPISODE MANIC WITH PSYCHOTIC SYMPTOMS (HCC): Primary | ICD-10-CM

## 2025-04-23 DIAGNOSIS — I10 BENIGN ESSENTIAL HTN: ICD-10-CM

## 2025-04-23 RX ORDER — LOSARTAN POTASSIUM 50 MG/1
50 TABLET ORAL DAILY
Qty: 30 TABLET | Refills: 0 | Status: SHIPPED | OUTPATIENT
Start: 2025-04-23

## 2025-04-23 NOTE — TELEPHONE ENCOUNTER
He needs to get lab work, I cannot send in any more medication for him until he gets this done he has not had his labs done since 2023

## 2025-04-23 NOTE — TELEPHONE ENCOUNTER
Medication:   Requested Prescriptions     Pending Prescriptions Disp Refills    losartan (COZAAR) 50 MG tablet [Pharmacy Med Name: LOSARTAN POTASSIUM 50 MG TAB] 30 tablet 0     Sig: TAKE 1 TABLET BY MOUTH DAILY     Last Filled:  2.25.25    Last appt: 2/25/2025   Next appt: Visit date not found    Last OARRS:        No data to display

## 2025-05-30 DIAGNOSIS — I10 BENIGN ESSENTIAL HTN: ICD-10-CM

## 2025-05-30 NOTE — TELEPHONE ENCOUNTER
Medication:   Requested Prescriptions     Pending Prescriptions Disp Refills    losartan (COZAAR) 50 MG tablet [Pharmacy Med Name: LOSARTAN POTASSIUM 50 MG TAB] 30 tablet 0     Sig: TAKE 1 TABLET BY MOUTH DAILY     Last filled: 4/23/25  Last appt: 2/25/2025   Next appt: Visit date not found    Last OARRS:        No data to display

## 2025-06-02 RX ORDER — LOSARTAN POTASSIUM 50 MG/1
50 TABLET ORAL DAILY
Qty: 30 TABLET | Refills: 0 | Status: SHIPPED | OUTPATIENT
Start: 2025-06-02

## 2025-07-01 DIAGNOSIS — I10 BENIGN ESSENTIAL HTN: ICD-10-CM

## 2025-07-01 RX ORDER — LOSARTAN POTASSIUM 50 MG/1
50 TABLET ORAL DAILY
Qty: 90 TABLET | Refills: 1 | Status: SHIPPED | OUTPATIENT
Start: 2025-07-01

## 2025-07-01 NOTE — TELEPHONE ENCOUNTER
Medication:   Requested Prescriptions     Pending Prescriptions Disp Refills    losartan (COZAAR) 50 MG tablet [Pharmacy Med Name: LOSARTAN POTASSIUM 50 MG TAB] 30 tablet 0     Sig: TAKE 1 TABLET BY MOUTH DAILY     Last Filled:  06/02/2025    Last appt: 2/25/2025   Next appt: Visit date not found    Last OARRS:        No data to display